# Patient Record
Sex: FEMALE | Race: WHITE | NOT HISPANIC OR LATINO | Employment: PART TIME | URBAN - METROPOLITAN AREA
[De-identification: names, ages, dates, MRNs, and addresses within clinical notes are randomized per-mention and may not be internally consistent; named-entity substitution may affect disease eponyms.]

---

## 2018-06-11 ENCOUNTER — OFFICE VISIT (OUTPATIENT)
Dept: FAMILY MEDICINE CLINIC | Facility: CLINIC | Age: 53
End: 2018-06-11

## 2018-06-11 VITALS
WEIGHT: 143.2 LBS | HEIGHT: 68 IN | RESPIRATION RATE: 18 BRPM | HEART RATE: 57 BPM | OXYGEN SATURATION: 99 % | DIASTOLIC BLOOD PRESSURE: 76 MMHG | TEMPERATURE: 99.5 F | SYSTOLIC BLOOD PRESSURE: 128 MMHG | BODY MASS INDEX: 21.7 KG/M2

## 2018-06-11 DIAGNOSIS — Z00.00 HEALTHCARE MAINTENANCE: ICD-10-CM

## 2018-06-11 DIAGNOSIS — M51.36 DEGENERATION, INTERVERTEBRAL DISC, LUMBAR: ICD-10-CM

## 2018-06-11 DIAGNOSIS — Q61.3 POLYCYSTIC KIDNEY DISEASE: Primary | ICD-10-CM

## 2018-06-11 PROCEDURE — 99203 OFFICE O/P NEW LOW 30 MIN: CPT | Performed by: FAMILY MEDICINE

## 2018-06-11 NOTE — PROGRESS NOTES
Alexsandra Valdes presents today to establish care.    Chief Complaint   Patient presents with   • Establish Care   • Polycystic Kidney Disease     and Cysts on Liver        HPI   Kidney disease:  Sister's kidneys started failing, went through process to donate kidney 5 years ago but discovered cysts on liver and kidneys. Saw nephologist and told to monitor. Yearly blood and urine tests. Kidney function stable. No recurrent UTIs. No HYPERTENSION.     Low back pain:  Degenerative disc left lower back. Sciatica pain left buttock. Treated with PT for 3 month and now chiropractor. Use OTC ibuprofen a couple time a month, otherwise Tylenol arthritis.  FH back problems.     Skin cancer:  H/o basal and squamous cell skin cancers. Followed by dermatology.  She denies any new lesions.      Walks every day. After walk has bilateral knee pain.     Mammogram yearly.     H/o cervical polyp removed in August. Yearly GYN exams.     Review of Systems   Constitutional: Negative.    Eyes: Negative.    Respiratory: Negative.    Cardiovascular: Negative.    Gastrointestinal: Negative.    Endocrine: Negative.    Genitourinary: Negative.    Musculoskeletal: Positive for arthralgias and back pain.   Skin: Negative.    Neurological: Negative.    Hematological: Negative.    Psychiatric/Behavioral: Negative.         Past Medical History:   Diagnosis Date   • Low back pain    • Polycystic kidney    • Polycystic liver disease    • Skin cancer, basal cell    • Squamous cell skin cancer         Past Surgical History:   Procedure Laterality Date   • MOUTH SURGERY     • TUBAL ABDOMINAL LIGATION          Family History   Problem Relation Age of Onset   • Hypertension Mother    • Heart disease Father    • Polycystic kidney disease Sister    • Other Brother    • Polycystic kidney disease Brother    • Cancer Son    • Cancer Maternal Grandfather    • Heart disease Paternal Grandmother    • Hypertension Daughter         Social History     Social History   •  "Marital status:      Spouse name: N/A   • Number of children: N/A   • Years of education: N/A     Occupational History   •       Social History Main Topics   • Smoking status: Never Smoker   • Smokeless tobacco: Never Used   • Alcohol use No   • Drug use: No   • Sexual activity: Not on file     Other Topics Concern   • Not on file     Social History Narrative   • No narrative on file        Current Outpatient Prescriptions   Medication Sig Dispense Refill   • Multiple Vitamins-Minerals (MULTIVITAMIN ADULT PO) Take 1 tablet by mouth Daily.       No current facility-administered medications for this visit.        Allergies no known allergies     Visit Vitals  /76 (BP Location: Right arm, Patient Position: Sitting)   Pulse 57   Temp 99.5 °F (37.5 °C) (Temporal Artery )   Resp 18   Ht 172.5 cm (67.91\")   Wt 65 kg (143 lb 3.2 oz)   SpO2 99%   BMI 21.83 kg/m²        Physical Exam   Constitutional: No distress.   Cardiovascular: Normal rate, regular rhythm and intact distal pulses.    No murmur heard.  Pulmonary/Chest: Effort normal and breath sounds normal.   Abdominal: Normal appearance. There is no hepatosplenomegaly. There is no tenderness.   Musculoskeletal:        Lumbar back: She exhibits no bony tenderness, no edema and no spasm.   Negative SLR bilateral   Skin: Skin is warm and dry. No rash noted.   Psychiatric: She has a normal mood and affect.   Vitals reviewed.       No results found for this or any previous visit.     Alexsandra was seen today for establish care and polycystic kidney disease.    Diagnoses and all orders for this visit:    Polycystic kidney disease  -     Comprehensive Metabolic Panel; Future  -     MicroAlbumin, Urine, Random - Urine, Clean Catch; Future  Obtain prior records.  No hypertension or known complications at this time.  Discussed limiting NSAIDs.  Patient reports yearly labs in November.  Plan to check labs 2-3 days prior to next appointment for a " physical.  Degeneration, intervertebral disc, lumbar  Obtain prior records.  Patient reports x-rays done by chiropractor.  Again advised to limit NSAIDs.  Use Tylenol when necessary.  Healthcare maintenance  -     CBC & Differential; Future  -     Lipid Panel; Future  -     TSH Rfx On Abnormal To Free T4; Future  Patient due for a yearly physical in November and plans to get labs 2-3 days in advance.

## 2018-10-22 ENCOUNTER — CLINICAL SUPPORT (OUTPATIENT)
Dept: FAMILY MEDICINE CLINIC | Facility: CLINIC | Age: 53
End: 2018-10-22

## 2018-10-22 DIAGNOSIS — Z00.00 HEALTH MAINTENANCE EXAMINATION: Primary | ICD-10-CM

## 2018-10-22 PROCEDURE — 86580 TB INTRADERMAL TEST: CPT | Performed by: FAMILY MEDICINE

## 2018-10-24 ENCOUNTER — CLINICAL SUPPORT (OUTPATIENT)
Dept: FAMILY MEDICINE CLINIC | Facility: CLINIC | Age: 53
End: 2018-10-24

## 2018-10-24 DIAGNOSIS — Z23 FLU VACCINE NEED: ICD-10-CM

## 2018-10-24 DIAGNOSIS — Z11.1 VISIT FOR TB SKIN TEST: Primary | ICD-10-CM

## 2018-10-24 LAB
INDURATION: 0 MM (ref 0–10)
TB SKIN TEST: NEGATIVE

## 2018-10-24 PROCEDURE — 90471 IMMUNIZATION ADMIN: CPT | Performed by: FAMILY MEDICINE

## 2018-10-24 PROCEDURE — 90686 IIV4 VACC NO PRSV 0.5 ML IM: CPT | Performed by: FAMILY MEDICINE

## 2018-11-27 ENCOUNTER — LAB (OUTPATIENT)
Dept: LAB | Facility: HOSPITAL | Age: 53
End: 2018-11-27

## 2018-11-27 DIAGNOSIS — Z00.00 HEALTHCARE MAINTENANCE: ICD-10-CM

## 2018-11-27 DIAGNOSIS — Q61.3 POLYCYSTIC KIDNEY DISEASE: ICD-10-CM

## 2018-11-27 LAB
ALBUMIN SERPL-MCNC: 4.61 G/DL (ref 3.2–4.8)
ALBUMIN/GLOB SERPL: 2.2 G/DL (ref 1.5–2.5)
ALP SERPL-CCNC: 66 U/L (ref 25–100)
ALT SERPL W P-5'-P-CCNC: 16 U/L (ref 7–40)
ANION GAP SERPL CALCULATED.3IONS-SCNC: 4 MMOL/L (ref 3–11)
ARTICHOKE IGE QN: 124 MG/DL (ref 0–130)
AST SERPL-CCNC: 23 U/L (ref 0–33)
BASOPHILS # BLD AUTO: 0.06 10*3/MM3 (ref 0–0.2)
BASOPHILS NFR BLD AUTO: 1.2 % (ref 0–1)
BILIRUB SERPL-MCNC: 0.6 MG/DL (ref 0.3–1.2)
BUN BLD-MCNC: 16 MG/DL (ref 9–23)
BUN/CREAT SERPL: 22.2 (ref 7–25)
CALCIUM SPEC-SCNC: 9.5 MG/DL (ref 8.7–10.4)
CHLORIDE SERPL-SCNC: 105 MMOL/L (ref 99–109)
CHOLEST SERPL-MCNC: 209 MG/DL (ref 0–200)
CO2 SERPL-SCNC: 31 MMOL/L (ref 20–31)
CREAT BLD-MCNC: 0.72 MG/DL (ref 0.6–1.3)
DEPRECATED RDW RBC AUTO: 40.8 FL (ref 37–54)
EOSINOPHIL # BLD AUTO: 0.14 10*3/MM3 (ref 0–0.3)
EOSINOPHIL NFR BLD AUTO: 2.9 % (ref 0–3)
ERYTHROCYTE [DISTWIDTH] IN BLOOD BY AUTOMATED COUNT: 12.7 % (ref 11.3–14.5)
GFR SERPL CREATININE-BSD FRML MDRD: 84 ML/MIN/1.73
GLOBULIN UR ELPH-MCNC: 2.1 GM/DL
GLUCOSE BLD-MCNC: 83 MG/DL (ref 70–100)
HCT VFR BLD AUTO: 38.9 % (ref 34.5–44)
HDLC SERPL-MCNC: 84 MG/DL (ref 40–60)
HGB BLD-MCNC: 13 G/DL (ref 11.5–15.5)
IMM GRANULOCYTES # BLD: 0 10*3/MM3 (ref 0–0.03)
IMM GRANULOCYTES NFR BLD: 0 % (ref 0–0.6)
LYMPHOCYTES # BLD AUTO: 1.41 10*3/MM3 (ref 0.6–4.8)
LYMPHOCYTES NFR BLD AUTO: 28.8 % (ref 24–44)
MCH RBC QN AUTO: 29.2 PG (ref 27–31)
MCHC RBC AUTO-ENTMCNC: 33.4 G/DL (ref 32–36)
MCV RBC AUTO: 87.4 FL (ref 80–99)
MONOCYTES # BLD AUTO: 0.41 10*3/MM3 (ref 0–1)
MONOCYTES NFR BLD AUTO: 8.4 % (ref 0–12)
NEUTROPHILS # BLD AUTO: 2.87 10*3/MM3 (ref 1.5–8.3)
NEUTROPHILS NFR BLD AUTO: 58.7 % (ref 41–71)
PLATELET # BLD AUTO: 225 10*3/MM3 (ref 150–450)
PMV BLD AUTO: 10.3 FL (ref 6–12)
POTASSIUM BLD-SCNC: 4.4 MMOL/L (ref 3.5–5.5)
PROT SERPL-MCNC: 6.7 G/DL (ref 5.7–8.2)
RBC # BLD AUTO: 4.45 10*6/MM3 (ref 3.89–5.14)
SODIUM BLD-SCNC: 140 MMOL/L (ref 132–146)
TRIGL SERPL-MCNC: 43 MG/DL (ref 0–150)
TSH SERPL DL<=0.05 MIU/L-ACNC: 3.95 MIU/ML (ref 0.35–5.35)
WBC NRBC COR # BLD: 4.89 10*3/MM3 (ref 3.5–10.8)

## 2018-11-27 PROCEDURE — 80061 LIPID PANEL: CPT | Performed by: FAMILY MEDICINE

## 2018-11-27 PROCEDURE — 36415 COLL VENOUS BLD VENIPUNCTURE: CPT

## 2018-11-27 PROCEDURE — 85025 COMPLETE CBC W/AUTO DIFF WBC: CPT | Performed by: FAMILY MEDICINE

## 2018-11-27 PROCEDURE — 84443 ASSAY THYROID STIM HORMONE: CPT | Performed by: FAMILY MEDICINE

## 2018-11-27 PROCEDURE — 82043 UR ALBUMIN QUANTITATIVE: CPT | Performed by: FAMILY MEDICINE

## 2018-11-27 PROCEDURE — 80053 COMPREHEN METABOLIC PANEL: CPT | Performed by: FAMILY MEDICINE

## 2018-11-28 LAB — MICROALBUMIN UR-MCNC: 9 UG/ML

## 2018-12-03 ENCOUNTER — LAB (OUTPATIENT)
Dept: LAB | Facility: HOSPITAL | Age: 53
End: 2018-12-03

## 2018-12-03 ENCOUNTER — OFFICE VISIT (OUTPATIENT)
Dept: FAMILY MEDICINE CLINIC | Facility: CLINIC | Age: 53
End: 2018-12-03

## 2018-12-03 VITALS
OXYGEN SATURATION: 98 % | SYSTOLIC BLOOD PRESSURE: 124 MMHG | HEIGHT: 68 IN | DIASTOLIC BLOOD PRESSURE: 76 MMHG | BODY MASS INDEX: 21.82 KG/M2 | HEART RATE: 70 BPM | WEIGHT: 144 LBS

## 2018-12-03 DIAGNOSIS — Z11.9 SCREENING EXAMINATION FOR INFECTIOUS DISEASE: ICD-10-CM

## 2018-12-03 DIAGNOSIS — Z00.00 WELL ADULT EXAM: Primary | ICD-10-CM

## 2018-12-03 DIAGNOSIS — Z23 NEED FOR DIPHTHERIA-TETANUS-PERTUSSIS (TDAP) VACCINE: ICD-10-CM

## 2018-12-03 DIAGNOSIS — R94.4 DECREASED CREATININE CLEARANCE: ICD-10-CM

## 2018-12-03 LAB
ANION GAP SERPL CALCULATED.3IONS-SCNC: 4 MMOL/L (ref 3–11)
BUN BLD-MCNC: 15 MG/DL (ref 9–23)
BUN/CREAT SERPL: 21.1 (ref 7–25)
CALCIUM SPEC-SCNC: 9.4 MG/DL (ref 8.7–10.4)
CHLORIDE SERPL-SCNC: 106 MMOL/L (ref 99–109)
CO2 SERPL-SCNC: 32 MMOL/L (ref 20–31)
CREAT BLD-MCNC: 0.71 MG/DL (ref 0.6–1.3)
GFR SERPL CREATININE-BSD FRML MDRD: 85 ML/MIN/1.73
GLUCOSE BLD-MCNC: 83 MG/DL (ref 70–100)
HCV AB SER DONR QL: NORMAL
POTASSIUM BLD-SCNC: 4.1 MMOL/L (ref 3.5–5.5)
SODIUM BLD-SCNC: 142 MMOL/L (ref 132–146)

## 2018-12-03 PROCEDURE — 86803 HEPATITIS C AB TEST: CPT | Performed by: FAMILY MEDICINE

## 2018-12-03 PROCEDURE — 36415 COLL VENOUS BLD VENIPUNCTURE: CPT

## 2018-12-03 PROCEDURE — 90715 TDAP VACCINE 7 YRS/> IM: CPT | Performed by: FAMILY MEDICINE

## 2018-12-03 PROCEDURE — 90471 IMMUNIZATION ADMIN: CPT | Performed by: FAMILY MEDICINE

## 2018-12-03 PROCEDURE — 80048 BASIC METABOLIC PNL TOTAL CA: CPT | Performed by: FAMILY MEDICINE

## 2018-12-03 PROCEDURE — 99396 PREV VISIT EST AGE 40-64: CPT | Performed by: FAMILY MEDICINE

## 2018-12-03 NOTE — PROGRESS NOTES
Chief Complaint   Patient presents with   • Annual Exam        HPI   Physical:  In 2009 [place] trip had hepatitis A and might have had tetanus immunization.      Fingers, knees and lower back left side pain. 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  Followed by chiropractor.     Sister had kidney transplant, but she was unable to donate because she has cyst on her kidneys and liver. Saw nephrologist at [facility] 5 years ago.     Regular diet. Eats more veggies and fruit than meat. Walks 3.5 miles a day with dogs before work.     Review of Systems   Constitutional: Positive for fatigue.   HENT: Negative for hearing loss.    Eyes: Negative for visual disturbance.        Dry eyes   Respiratory: Negative for cough and shortness of breath.    Cardiovascular: Negative for chest pain and palpitations.   Gastrointestinal: Negative for abdominal pain, blood in stool, constipation, diarrhea and GERD.   Genitourinary: Negative for frequency.   Musculoskeletal: Positive for arthralgias and back pain.   Skin: Negative for rash and skin lesions.   Neurological: Positive for headache. Negative for dizziness.   Hematological: Does not bruise/bleed easily.   Psychiatric/Behavioral: Negative for sleep disturbance and depressed mood.        Current Outpatient Medications on File Prior to Visit   Medication Sig Dispense Refill   • Multiple Vitamins-Minerals (MULTIVITAMIN ADULT PO) Take 1 tablet by mouth Daily.       No current facility-administered medications on file prior to visit.        No Known Allergies    Past Medical History:   Diagnosis Date   • Anxiety 07/15/2013   • Hyperlipidemia 2018   • Low back pain    • Palpitations 07/15/2013   • Polycystic kidney    • Polycystic liver disease    • Rotator cuff disorder 11/21/2016   • Shingles    • Skin cancer, basal cell    • Squamous cell skin cancer         Past Surgical History:   Procedure Laterality Date   • MOUTH SURGERY     • TUBAL ABDOMINAL LIGATION       "    Family History   Problem Relation Age of Onset   • Hypertension Mother    • Heart disease Father    • Polycystic kidney disease Sister    • Other Sister         degenerative disc disease   • Other Brother         degenerative disc disease   • Polycystic kidney disease Brother    • Cancer Son    • Other Son         degenerative disc disease   • Cancer Maternal Grandfather    • Heart disease Paternal Grandmother    • Hypertension Daughter         Social History     Socioeconomic History   • Marital status:      Spouse name: Not on file   • Number of children: Not on file   • Years of education: Not on file   • Highest education level: Not on file   Social Needs   • Financial resource strain: Not on file   • Food insecurity - worry: Not on file   • Food insecurity - inability: Not on file   • Transportation needs - medical: Not on file   • Transportation needs - non-medical: Not on file   Occupational History   • Occupation:    Tobacco Use   • Smoking status: Never Smoker   • Smokeless tobacco: Never Used   Substance and Sexual Activity   • Alcohol use: No   • Drug use: No   • Sexual activity: Not on file   Other Topics Concern   • Not on file   Social History Narrative   • Not on file        Visit Vitals  /76 (BP Location: Left arm, Patient Position: Sitting, Cuff Size: Adult)   Pulse 70   Ht 172.7 cm (68\")   Wt 65.3 kg (144 lb)   SpO2 98%   BMI 21.90 kg/m²        Physical Exam   Constitutional: She is oriented to person, place, and time. No distress.   HENT:   Nose: Nose normal.   Mouth/Throat: Oropharynx is clear and moist.   Eyes: Conjunctivae are normal. Pupils are equal, round, and reactive to light.   Neck: Neck supple. No thyromegaly present.   Cardiovascular: Normal rate and regular rhythm.   No murmur heard.  Pulses:       Posterior tibial pulses are 2+ on the right side, and 2+ on the left side.   Pulmonary/Chest: Effort normal and breath sounds normal. Right breast exhibits " no inverted nipple, no mass, no nipple discharge, no skin change and no tenderness. Left breast exhibits no inverted nipple, no mass, no nipple discharge, no skin change and no tenderness.   Raleigh Pennington MA.      Abdominal: Soft. There is no hepatosplenomegaly. There is no tenderness.   Musculoskeletal: She exhibits no edema.   Lymphadenopathy:     She has no cervical adenopathy.   Neurological: She is alert and oriented to person, place, and time.   Skin: Skin is warm and dry. No rash noted.   Psychiatric: She has a normal mood and affect. Her behavior is normal. Judgment and thought content normal.   Vitals reviewed.      Results for orders placed or performed in visit on 11/27/18   Comprehensive Metabolic Panel   Result Value Ref Range    Glucose 83 70 - 100 mg/dL    BUN 16 9 - 23 mg/dL    Creatinine 0.72 0.60 - 1.30 mg/dL    Sodium 140 132 - 146 mmol/L    Potassium 4.4 3.5 - 5.5 mmol/L    Chloride 105 99 - 109 mmol/L    CO2 31.0 20.0 - 31.0 mmol/L    Calcium 9.5 8.7 - 10.4 mg/dL    Total Protein 6.7 5.7 - 8.2 g/dL    Albumin 4.61 3.20 - 4.80 g/dL    ALT (SGPT) 16 7 - 40 U/L    AST (SGOT) 23 0 - 33 U/L    Alkaline Phosphatase 66 25 - 100 U/L    Total Bilirubin 0.6 0.3 - 1.2 mg/dL    eGFR Non African Amer 84 >60 mL/min/1.73    Globulin 2.1 gm/dL    A/G Ratio 2.2 1.5 - 2.5 g/dL    BUN/Creatinine Ratio 22.2 7.0 - 25.0    Anion Gap 4.0 3.0 - 11.0 mmol/L   Lipid Panel   Result Value Ref Range    Total Cholesterol 209 (H) 0 - 200 mg/dL    Triglycerides 43 0 - 150 mg/dL    HDL Cholesterol 84 (H) 40 - 60 mg/dL    LDL Cholesterol  124 0 - 130 mg/dL   TSH Rfx On Abnormal To Free T4   Result Value Ref Range    TSH 3.949 0.350 - 5.350 mIU/mL   MicroAlbumin, Urine, Random - Urine, Clean Catch   Result Value Ref Range    Microalbumin, Urine 9.0 Not Estab. ug/mL   CBC Auto Differential   Result Value Ref Range    WBC 4.89 3.50 - 10.80 10*3/mm3    RBC 4.45 3.89 - 5.14 10*6/mm3    Hemoglobin 13.0 11.5 - 15.5 g/dL     Hematocrit 38.9 34.5 - 44.0 %    MCV 87.4 80.0 - 99.0 fL    MCH 29.2 27.0 - 31.0 pg    MCHC 33.4 32.0 - 36.0 g/dL    RDW 12.7 11.3 - 14.5 %    RDW-SD 40.8 37.0 - 54.0 fl    MPV 10.3 6.0 - 12.0 fL    Platelets 225 150 - 450 10*3/mm3    Neutrophil % 58.7 41.0 - 71.0 %    Lymphocyte % 28.8 24.0 - 44.0 %    Monocyte % 8.4 0.0 - 12.0 %    Eosinophil % 2.9 0.0 - 3.0 %    Basophil % 1.2 (H) 0.0 - 1.0 %    Immature Grans % 0.0 0.0 - 0.6 %    Neutrophils, Absolute 2.87 1.50 - 8.30 10*3/mm3    Lymphocytes, Absolute 1.41 0.60 - 4.80 10*3/mm3    Monocytes, Absolute 0.41 0.00 - 1.00 10*3/mm3    Eosinophils, Absolute 0.14 0.00 - 0.30 10*3/mm3    Basophils, Absolute 0.06 0.00 - 0.20 10*3/mm3    Immature Grans, Absolute 0.00 0.00 - 0.03 10*3/mm3   The 10-year ASCVD risk score (Guy GIANA Jr., et al., 2013) is: 1.3%    Values used to calculate the score:      Age: 53 years      Sex: Female      Is Non- : No      Diabetic: No      Tobacco smoker: No      Systolic Blood Pressure: 124 mmHg      Is BP treated: No      HDL Cholesterol: 84 mg/dL      Total Cholesterol: 209 mg/dL       Immunization History   Administered Date(s) Administered   • FLUARIX/FLUZONE/AFLURIA/FLULAVAL QUAD 10/24/2018   • Flu Vaccine Intradermal Quad 18-64YR 12/21/2012   • PPD Test 12/21/2012, 10/22/2018   • Tdap 12/03/2018       Alexsandra was seen today for annual exam.    Diagnoses and all orders for this visit:    Well adult exam  Prior OB/GYN records and pap smear requested.   Mammogram birads 2 7/17/2018.   Colonoscopy current.   Counseled on Shingrix vaccine and handout provided.   Reviewed pre visit labs in detail with patient.   Need for diphtheria-tetanus-pertussis (Tdap) vaccine  -     Tdap Vaccine Greater Than or Equal To 8yo IM  Last tetanus unknown, updated today.   Decreased creatinine clearance  -     Basic metabolic panel; Future   -> 84. Check repeat and if still low will refer back to nephrology due to FH polycystic kidney  disease.   Screening examination for infectious disease  -     Hepatitis C Antibody; Future  [id number] screening.       Return in about 1 year (around 12/3/2019) for Annual physical.

## 2018-12-03 NOTE — PATIENT INSTRUCTIONS
Recombinant Zoster (Shingles) Vaccine, RZV: What You Need to Know  1. Why get vaccinated?  Shingles (also called herpes zoster, or just zoster) is a painful skin rash, often with blisters. Shingles is caused by the varicella zoster virus, the same virus that causes chickenpox. After you have chickenpox, the virus stays in your body and can cause shingles later in life.  You can't catch shingles from another person. However, a person who has never had chickenpox (or chickenpox vaccine) could get chickenpox from someone with shingles.  A shingles rash usually appears on one side of the face or body and heals within 2 to 4 weeks. Its main symptom is pain, which can be severe. Other symptoms can include fever, headache, chills and upset stomach. Very rarely, a shingles infection can lead to pneumonia, hearing problems, blindness, brain inflammation (encephalitis), or death.  For about 1 person in 5, severe pain can continue even long after the rash has cleared up. This long-lasting pain is called post-herpetic neuralgia (PHN).  Shingles is far more common in people 50 years of age and older than in younger people, and the risk increases with age. It is also more common in people whose immune system is weakened because of a disease such as cancer, or by drugs such as steroids or chemotherapy.  At least 1 million people a year in the [place] get shingles.  2. Shingles vaccine (recombinant)  Recombinant shingles vaccine was approved by FDA in 2017 for the prevention of shingles. In clinical trials, it was more than 90% effective in preventing shingles. It can also reduce the likelihood of PHN.  Two doses, 2 to 6 months apart, are recommended for adults 50 and older.  This vaccine is also recommended for people who have already gotten the live shingles vaccine (Zostavax). There is no live virus in this vaccine.  3. Some people should not get this vaccine  Tell your vaccine provider if you:  · Have any severe,  life-threatening allergies. A person who has ever had a life-threatening allergic reaction after a dose of recombinant shingles vaccine, or has a severe allergy to any component of this vaccine, may be advised not to be vaccinated. Ask your health care provider if you want information about vaccine components.  · Are pregnant or breastfeeding. There is not much information about use of recombinant shingles vaccine in pregnant or nursing women. Your healthcare provider might recommend delaying vaccination.  · Are not feeling well. If you have a mild illness, such as a cold, you can probably get the vaccine today. If you are moderately or severely ill, you should probably wait until you recover. Your doctor can advise you.    4. Risks of a vaccine reaction  With any medicine, including vaccines, there is a chance of reactions.  After recombinant shingles vaccination, a person might experience:  · Pain, redness, soreness, or swelling at the site of the injection  · Headache, muscle aches, fever, shivering, fatigue    In clinical trials, most people got a sore arm with mild or moderate pain after vaccination, and some also had redness and swelling where they got the shot. Some people felt tired, had muscle pain, a headache, shivering, fever, stomach pain, or nausea. About 1 out of 6 people who got recombinant zoster vaccine experienced side effects that prevented them from doing regular activities. Symptoms went away on their own in about 2 to 3 days. Side effects were more common in younger people.  You should still get the second dose of recombinant zoster vaccine even if you had one of these reactions after the first dose.  Other things that could happen after this vaccine:  · People sometimes faint after medical procedures, including vaccination. Sitting or lying down for about 15 minutes can help prevent fainting and injuries caused by a fall. Tell your provider if you feel dizzy or have vision changes or ringing  in the ears.  · Some people get shoulder pain that can be more severe and longer-lasting than routine soreness that can follow injections. This happens very rarely.  · Any medication can cause a severe allergic reaction. Such reactions to a vaccine are estimated at about 1 in a million doses, and would happen within a few minutes to a few hours after the vaccination.  As with any medicine, there is a very remote chance of a vaccine causing a serious injury or death.  The safety of vaccines is always being monitored. For more information, visit: www.cdc.gov/vaccinesafety/  5. What if there is a serious problem?  What should I look for?  · Look for anything that concerns you, such as signs of a severe allergic reaction, very high fever, or unusual behavior.  Signs of a severe allergic reaction can include hives, swelling of the face and throat, difficulty breathing, a fast heartbeat, dizziness, and weakness. These would usually start a few minutes to a few hours after the vaccination.  What should I do?  · If you think it is a severe allergic reaction or other emergency that can't wait, call 9-1-1 and get to the nearest hospital. Otherwise, call your health care provider.  Afterward, the reaction should be reported to the Vaccine Adverse Event Reporting System (VAERS). Your doctor should file this report, or you can do it yourself through the VAERS web site atwww.vaers.[facility].govor by calling 1-845.813.9635.  VAERS does not give medical advice.  6. How can I learn more?  · Ask your healthcare provider. He or she can give you the vaccine package insert or suggest other sources of information.  · Call your local or state health department.  · Contact the Centers for Disease Control and Prevention (CDC):  ? Call 1-267.137.3630 (6-050-DPI-INFO) or  ? Visit the CDC's website at www.cdc.gov/vaccines  CDC Vaccine Information Statement (VIS) Recombinant Zoster Vaccine (02/12/2018)  This information is not intended to replace  advice given to you by your health care provider. Make sure you discuss any questions you have with your health care provider.  Document Released: 02/27/2018 Document Revised: 02/27/2018 Document Reviewed: 02/27/2018  Elsevier Interactive Patient Education © 2018 [employer]

## 2018-12-04 DIAGNOSIS — R94.4 DECREASED GFR: ICD-10-CM

## 2018-12-04 DIAGNOSIS — Z82.71 FAMILY HISTORY OF ADULT POLYCYSTIC KIDNEY DISEASE: Primary | ICD-10-CM

## 2019-07-22 ENCOUNTER — TELEPHONE (OUTPATIENT)
Dept: FAMILY MEDICINE CLINIC | Facility: CLINIC | Age: 54
End: 2019-07-22

## 2019-07-22 RX ORDER — ONDANSETRON 4 MG/1
4 TABLET, FILM COATED ORAL EVERY 8 HOURS PRN
Qty: 10 TABLET | Refills: 0 | Status: SHIPPED | OUTPATIENT
Start: 2019-07-22 | End: 2019-12-04

## 2019-07-22 RX ORDER — SCOLOPAMINE TRANSDERMAL SYSTEM 1 MG/1
1 PATCH, EXTENDED RELEASE TRANSDERMAL
Qty: 3 PATCH | Refills: 0 | Status: SHIPPED | OUTPATIENT
Start: 2019-07-22 | End: 2019-12-04

## 2019-07-22 NOTE — TELEPHONE ENCOUNTER
at appt today mentioned going a cruise and needs medication for motion sickness. Sent script for transderm scop and zofran.

## 2019-12-04 ENCOUNTER — LAB REQUISITION (OUTPATIENT)
Dept: LAB | Facility: HOSPITAL | Age: 54
End: 2019-12-04

## 2019-12-04 ENCOUNTER — OFFICE VISIT (OUTPATIENT)
Dept: FAMILY MEDICINE CLINIC | Facility: CLINIC | Age: 54
End: 2019-12-04

## 2019-12-04 VITALS
OXYGEN SATURATION: 99 % | DIASTOLIC BLOOD PRESSURE: 64 MMHG | HEIGHT: 68 IN | BODY MASS INDEX: 21.64 KG/M2 | SYSTOLIC BLOOD PRESSURE: 102 MMHG | WEIGHT: 142.8 LBS | HEART RATE: 66 BPM

## 2019-12-04 DIAGNOSIS — Q61.3 POLYCYSTIC KIDNEY DISEASE: ICD-10-CM

## 2019-12-04 DIAGNOSIS — E78.00 PURE HYPERCHOLESTEROLEMIA: ICD-10-CM

## 2019-12-04 DIAGNOSIS — Z00.00 WELL ADULT EXAM: Primary | ICD-10-CM

## 2019-12-04 DIAGNOSIS — Z23 NEED FOR VACCINATION: ICD-10-CM

## 2019-12-04 DIAGNOSIS — Z00.00 ROUTINE GENERAL MEDICAL EXAMINATION AT A HEALTH CARE FACILITY: ICD-10-CM

## 2019-12-04 PROCEDURE — 99396 PREV VISIT EST AGE 40-64: CPT | Performed by: FAMILY MEDICINE

## 2019-12-04 PROCEDURE — 36415 COLL VENOUS BLD VENIPUNCTURE: CPT | Performed by: INTERNAL MEDICINE

## 2019-12-04 PROCEDURE — 90471 IMMUNIZATION ADMIN: CPT | Performed by: FAMILY MEDICINE

## 2019-12-04 PROCEDURE — 90632 HEPA VACCINE ADULT IM: CPT | Performed by: FAMILY MEDICINE

## 2019-12-04 PROCEDURE — 90472 IMMUNIZATION ADMIN EACH ADD: CPT | Performed by: FAMILY MEDICINE

## 2019-12-04 PROCEDURE — 90686 IIV4 VACC NO PRSV 0.5 ML IM: CPT | Performed by: FAMILY MEDICINE

## 2019-12-04 PROCEDURE — 80061 LIPID PANEL: CPT | Performed by: FAMILY MEDICINE

## 2019-12-04 PROCEDURE — 80053 COMPREHEN METABOLIC PANEL: CPT | Performed by: FAMILY MEDICINE

## 2019-12-04 NOTE — PROGRESS NOTES
Alexsandra Valdes presents today for a physical    Chief Complaint   Patient presents with   • Annual Exam     pt reports that she has already had PAP done in October at Dr Kuhn office        HPI     Generally healthy diet. She eats too much sugar. She walks regularly. She has gained a few pounds.     Menopause, no menses for 2 years.     PHQ-2/PHQ-9 Depression Screening 12/4/2019   Little interest or pleasure in doing things 0   Feeling down, depressed, or hopeless 0   Total Score 0       Saw nephrology at  for polycystic kidneys, had labs.     Back pain is better, sees chiropractor monthly. She takes occasional Tylenol, tries to avoid ibuprofen.         Review of Systems   Constitutional: Positive for fatigue.   HENT: Negative for hearing loss.    Eyes: Negative for visual disturbance.   Respiratory: Negative for cough and shortness of breath.    Cardiovascular: Positive for palpitations. Negative for chest pain.   Gastrointestinal: Negative for constipation, diarrhea and GERD.   Genitourinary: Positive for amenorrhea.   Musculoskeletal: Positive for back pain.   Neurological: Negative for dizziness and headache.   Psychiatric/Behavioral: Negative for depressed mood.        Health Maintenance   Topic Date Due   • ZOSTER VACCINE (1 of 2) 09/13/2013   • INFLUENZA VACCINE  08/01/2019   • LIPID PANEL  11/27/2019   • MAMMOGRAM  07/17/2020   • COLONOSCOPY  01/16/2022   • PAP SMEAR  08/22/2022   • TDAP/TD VACCINES (2 - Td) 12/03/2028       Past Medical History:   Diagnosis Date   • Anxiety 07/15/2013   • Hyperlipidemia 2018   • Low back pain    • Palpitations 07/15/2013   • Polycystic kidney    • Polycystic liver disease    • Rotator cuff disorder 11/21/2016   • Shingles    • Skin cancer, basal cell    • Squamous cell skin cancer         Past Surgical History:   Procedure Laterality Date   • MOUTH SURGERY     • TUBAL ABDOMINAL LIGATION          Family History   Problem Relation Age of Onset   • Hypertension Mother    •  "Heart disease Father    • Polycystic kidney disease Sister    • Other Sister         degenerative disc disease   • Other Brother         degenerative disc disease   • Polycystic kidney disease Brother    • Cancer Son    • Other Son         degenerative disc disease   • Cancer Maternal Grandfather    • Heart disease Paternal Grandmother    • Hypertension Daughter         Social History     Socioeconomic History   • Marital status:      Spouse name: Not on file   • Number of children: Not on file   • Years of education: Not on file   • Highest education level: Not on file   Occupational History   • Occupation:    Tobacco Use   • Smoking status: Never Smoker   • Smokeless tobacco: Never Used   Substance and Sexual Activity   • Alcohol use: No   • Drug use: No        Current Outpatient Medications on File Prior to Visit   Medication Sig Dispense Refill   • Multiple Vitamins-Minerals (MULTIVITAMIN ADULT PO) Take 1 tablet by mouth Daily.     • [DISCONTINUED] ondansetron (ZOFRAN) 4 MG tablet Take 1 tablet by mouth Every 8 (Eight) Hours As Needed for Nausea or Vomiting. 10 tablet 0   • [DISCONTINUED] Scopolamine (TRANSDERM-SCOP, 1.5 MG,) 1.5 MG/3DAYS patch Place 1 patch on the skin as directed by provider Every 72 (Seventy-Two) Hours. 3 patch 0     No current facility-administered medications on file prior to visit.        No Known Allergies     Visit Vitals  /64 (BP Location: Left arm, Patient Position: Sitting, Cuff Size: Adult)   Pulse 66   Ht 172.7 cm (68\")   Wt 64.8 kg (142 lb 12.8 oz)   SpO2 99%   BMI 21.71 kg/m²        Physical Exam   Constitutional: She is oriented to person, place, and time. No distress.   HENT:   Nose: Nose normal.   Mouth/Throat: Oropharynx is clear and moist.   Eyes: Conjunctivae are normal. Pupils are equal, round, and reactive to light.   Neck: Neck supple. No thyromegaly present.   Cardiovascular: Normal rate and regular rhythm.   No murmur heard.  Pulses:       " Posterior tibial pulses are 2+ on the right side, and 2+ on the left side.   Pulmonary/Chest: Effort normal and breath sounds normal.   Abdominal: Soft. There is no hepatosplenomegaly. There is no tenderness.   Lymphadenopathy:     She has no cervical adenopathy.   Neurological: She is alert and oriented to person, place, and time.   Skin: Skin is warm and dry. No rash noted.   Psychiatric: She has a normal mood and affect.   Vitals reviewed.            Immunization History   Administered Date(s) Administered   • FLUARIX/FLUZONE/AFLURIA/FLULAVAL QUAD 10/24/2018, 12/04/2019   • Flu Vaccine Intradermal Quad 18-64YR 12/21/2012   • Hepatitis A 12/04/2019   • PPD Test 12/21/2012, 10/22/2018   • Tdap 12/03/2018       Alexsandra was seen today for annual exam.    Diagnoses and all orders for this visit:    Well adult exam  -     Comprehensive Metabolic Panel; Future  -     Lipid Panel; Future  -     Comprehensive Metabolic Panel  -     Lipid Panel  GYN records and Pap smear requested.  Work form signed.  She stated that she did not need PPD testing today.  Pure hypercholesterolemia  -     Comprehensive Metabolic Panel; Future  -     Lipid Panel; Future  -     Comprehensive Metabolic Panel  -     Lipid Panel  Mild elevation in cholesterol last year.  Not currently on medications.  Recheck today.  Polycystic kidney disease  -     Comprehensive Metabolic Panel; Future  -     Comprehensive Metabolic Panel  Obtain nephrology records.  Check renal function today.  Need for vaccination  -     Fluarix/Fluzone/Afluria Quad>6 Months  -     Hepatitis A Vaccine Adult IM  Return after 6 months for second hepatitis A booster.      Patient's Body mass index is 21.71 kg/m². BMI is within normal parameters. No follow-up required..      Counseled on health maintenance topics and preventative care recommendations: Influenza vaccine, hepatitis A vaccine, cervical cancer screening     Return in about 1 year (around 12/4/2020) for Physical.      Tosha Fine

## 2019-12-05 LAB
ALBUMIN SERPL-MCNC: 4.8 G/DL (ref 3.5–5.2)
ALBUMIN/GLOB SERPL: 1.8 G/DL
ALP SERPL-CCNC: 68 U/L (ref 39–117)
ALT SERPL W P-5'-P-CCNC: 14 U/L (ref 1–33)
ANION GAP SERPL CALCULATED.3IONS-SCNC: 12.9 MMOL/L (ref 5–15)
AST SERPL-CCNC: 22 U/L (ref 1–32)
BILIRUB SERPL-MCNC: 0.5 MG/DL (ref 0.2–1.2)
BUN BLD-MCNC: 14 MG/DL (ref 6–20)
BUN/CREAT SERPL: 20.3 (ref 7–25)
CALCIUM SPEC-SCNC: 9.6 MG/DL (ref 8.6–10.5)
CHLORIDE SERPL-SCNC: 102 MMOL/L (ref 98–107)
CHOLEST SERPL-MCNC: 226 MG/DL (ref 0–200)
CO2 SERPL-SCNC: 26.1 MMOL/L (ref 22–29)
CREAT BLD-MCNC: 0.69 MG/DL (ref 0.57–1)
GFR SERPL CREATININE-BSD FRML MDRD: 88 ML/MIN/1.73
GLOBULIN UR ELPH-MCNC: 2.7 GM/DL
GLUCOSE BLD-MCNC: 82 MG/DL (ref 65–99)
HDLC SERPL-MCNC: 91 MG/DL (ref 40–60)
LDLC SERPL CALC-MCNC: 126 MG/DL (ref 0–100)
LDLC/HDLC SERPL: 1.38 {RATIO}
POTASSIUM BLD-SCNC: 3.8 MMOL/L (ref 3.5–5.2)
PROT SERPL-MCNC: 7.5 G/DL (ref 6–8.5)
SODIUM BLD-SCNC: 141 MMOL/L (ref 136–145)
TRIGL SERPL-MCNC: 47 MG/DL (ref 0–150)
VLDLC SERPL-MCNC: 9.4 MG/DL

## 2020-02-26 ENCOUNTER — OFFICE VISIT (OUTPATIENT)
Dept: FAMILY MEDICINE CLINIC | Facility: CLINIC | Age: 55
End: 2020-02-26

## 2020-02-26 ENCOUNTER — LAB REQUISITION (OUTPATIENT)
Dept: LAB | Facility: HOSPITAL | Age: 55
End: 2020-02-26

## 2020-02-26 VITALS
OXYGEN SATURATION: 98 % | HEART RATE: 88 BPM | DIASTOLIC BLOOD PRESSURE: 88 MMHG | WEIGHT: 145.4 LBS | HEIGHT: 68 IN | SYSTOLIC BLOOD PRESSURE: 158 MMHG | BODY MASS INDEX: 22.04 KG/M2

## 2020-02-26 DIAGNOSIS — Z00.00 ROUTINE GENERAL MEDICAL EXAMINATION AT A HEALTH CARE FACILITY: ICD-10-CM

## 2020-02-26 DIAGNOSIS — R07.89 CHEST PRESSURE: ICD-10-CM

## 2020-02-26 DIAGNOSIS — R03.0 ELEVATED BLOOD PRESSURE READING: Primary | ICD-10-CM

## 2020-02-26 DIAGNOSIS — R00.2 PALPITATIONS: ICD-10-CM

## 2020-02-26 PROCEDURE — 93000 ELECTROCARDIOGRAM COMPLETE: CPT | Performed by: FAMILY MEDICINE

## 2020-02-26 PROCEDURE — 36415 COLL VENOUS BLD VENIPUNCTURE: CPT | Performed by: FAMILY MEDICINE

## 2020-02-26 PROCEDURE — 99214 OFFICE O/P EST MOD 30 MIN: CPT | Performed by: FAMILY MEDICINE

## 2020-02-26 RX ORDER — CARVEDILOL 3.12 MG/1
3.12 TABLET ORAL 2 TIMES DAILY WITH MEALS
Qty: 60 TABLET | Refills: 1 | Status: SHIPPED | OUTPATIENT
Start: 2020-02-26 | End: 2020-05-11

## 2020-02-26 NOTE — PROGRESS NOTES
Chief Complaint   Patient presents with   • Shortness of Breath     pressure in chest, doesn't feel sick, has been going on for a couple of months. Exercises regularly. Occasional headaches. Family history of high blood pressure        Shortness of Breath   This is a new problem. The current episode started today. The problem occurs intermittently. The problem has been rapidly improving. Associated symptoms include chest pain. The symptoms are aggravated by exercise. She has tried rest for the symptoms. The treatment provided significant relief. There is no history of CAD, chronic lung disease or a heart failure.      In December thought she had stress of holidays. Denies feeling anxious.  She is concerned about weight gain, walks every day. Started [employer] 2 times a week last month, but didn't feel right in her chest. Two weeks ago woke up at 3 am heart pounding. Took walk this morning and she got winded. She feels fluttering like she skips a beat. Mother and sister have high blood pressure.       Review of Systems   Respiratory: Positive for shortness of breath.    Cardiovascular: Positive for chest pain and palpitations.   Psychiatric/Behavioral: The patient is not nervous/anxious.         Current Outpatient Medications on File Prior to Visit   Medication Sig Dispense Refill   • Multiple Vitamins-Minerals (MULTIVITAMIN ADULT PO) Take 1 tablet by mouth Daily.       No current facility-administered medications on file prior to visit.        No Known Allergies    Past Medical History:   Diagnosis Date   • Anxiety 07/15/2013   • Hyperlipidemia 2018   • Low back pain    • Palpitations 07/15/2013   • Polycystic kidney    • Polycystic liver disease    • Rotator cuff disorder 11/21/2016   • Shingles    • Skin cancer, basal cell    • Squamous cell skin cancer         Past Surgical History:   Procedure Laterality Date   • MOUTH SURGERY     • TUBAL ABDOMINAL LIGATION          Family History   Problem Relation Age of  "Onset   • Hypertension Mother    • Heart disease Father    • Polycystic kidney disease Sister    • Other Sister         degenerative disc disease   • Other Brother         degenerative disc disease   • Polycystic kidney disease Brother    • Cancer Son    • Other Son         degenerative disc disease   • Cancer Maternal Grandfather    • Heart disease Paternal Grandmother    • Hypertension Daughter         Social History     Socioeconomic History   • Marital status:      Spouse name: Not on file   • Number of children: Not on file   • Years of education: Not on file   • Highest education level: Not on file   Occupational History   • Occupation:    Tobacco Use   • Smoking status: Never Smoker   • Smokeless tobacco: Never Used   Substance and Sexual Activity   • Alcohol use: No   • Drug use: No        Visit Vitals  /88 (BP Location: Left arm, Patient Position: Sitting, Cuff Size: Adult)   Pulse 88   Ht 172.7 cm (68\")   Wt 66 kg (145 lb 6.4 oz)   SpO2 98%   BMI 22.11 kg/m²        Physical Exam   Constitutional: No distress.   Cardiovascular: Normal rate and regular rhythm.   No murmur heard.  Pulmonary/Chest: Effort normal and breath sounds normal.   Musculoskeletal: She exhibits no edema.   Neurological: She is alert.   Skin: Skin is warm and dry.   Psychiatric: She has a normal mood and affect.   Vitals reviewed.         ECG 12 Lead  Date/Time: 2/26/2020 4:20 PM  Performed by: Tosha Bruno MD  Authorized by: Tosha Burno MD   Comparison: compared with previous ECG from 11/22/2017  Rhythm: sinus rhythm  Rate: normal  Conduction: conduction normal  Conduction: incomplete right bundle branch block  ST Segments: ST segments normal  T inversion: V1 and V2  QRS axis: normal    Clinical impression: abnormal EKG and non-specific ECG            Results for orders placed or performed in visit on 12/04/19   Comprehensive Metabolic Panel   Result Value Ref Range    " Glucose 82 65 - 99 mg/dL    BUN 14 6 - 20 mg/dL    Creatinine 0.69 0.57 - 1.00 mg/dL    Sodium 141 136 - 145 mmol/L    Potassium 3.8 3.5 - 5.2 mmol/L    Chloride 102 98 - 107 mmol/L    CO2 26.1 22.0 - 29.0 mmol/L    Calcium 9.6 8.6 - 10.5 mg/dL    Total Protein 7.5 6.0 - 8.5 g/dL    Albumin 4.80 3.50 - 5.20 g/dL    ALT (SGPT) 14 1 - 33 U/L    AST (SGOT) 22 1 - 32 U/L    Alkaline Phosphatase 68 39 - 117 U/L    Total Bilirubin 0.5 0.2 - 1.2 mg/dL    eGFR Non African Amer 88 >60 mL/min/1.73    Globulin 2.7 gm/dL    A/G Ratio 1.8 g/dL    BUN/Creatinine Ratio 20.3 7.0 - 25.0    Anion Gap 12.9 5.0 - 15.0 mmol/L   Lipid Panel   Result Value Ref Range    Total Cholesterol 226 (H) 0 - 200 mg/dL    Triglycerides 47 0 - 150 mg/dL    HDL Cholesterol 91 (H) 40 - 60 mg/dL    LDL Cholesterol  126 (H) 0 - 100 mg/dL    VLDL Cholesterol 9.4 mg/dL    LDL/HDL Ratio 1.38         Alexsandra was seen today for shortness of breath.    Diagnoses and all orders for this visit:    Elevated blood pressure reading  -     ECG 12 Lead  -     Ambulatory Referral to [facility] [place] [facility] [place]  -     carvedilol (COREG) 3.125 MG tablet; Take 1 tablet by mouth 2 (Two) Times a Day With Meals.  -     TSH Rfx On Abnormal To Free T4; Future  -     Basic metabolic panel; Future    Chest pressure  -     ECG 12 Lead  -     Ambulatory Referral to [facility] and [facility] [place] [facility] [place]    Palpitations  -     Ambulatory Referral to [facility] and [facility] [place] - [place]  -     carvedilol (COREG) 3.125 MG tablet; Take 1 tablet by mouth 2 (Two) Times a Day With Meals.  -     TSH Rfx On Abnormal To Free T4; Future  -     Basic metabolic panel; Future      EKG in the office today without signs of ischemia. Recommend further evaluation at heart clinic. Blood pressure significantly elevated compared to her physical in December. Also with new onset palpitations. Check labs today. Start low dose beta blocker. Cautioned on side effects. Monitor BP and pulse at  home.     Return in about 2 weeks (around 3/11/2020).    Dr. Tosha Fine

## 2020-02-27 LAB
BUN SERPL-MCNC: 13 MG/DL (ref 6–20)
BUN/CREAT SERPL: 19.1 (ref 7–25)
CALCIUM SERPL-MCNC: 9.6 MG/DL (ref 8.6–10.5)
CHLORIDE SERPL-SCNC: 99 MMOL/L (ref 98–107)
CO2 SERPL-SCNC: 24.7 MMOL/L (ref 22–29)
CREAT SERPL-MCNC: 0.68 MG/DL (ref 0.57–1)
GLUCOSE SERPL-MCNC: 90 MG/DL (ref 65–99)
POTASSIUM SERPL-SCNC: 4.1 MMOL/L (ref 3.5–5.2)
SODIUM SERPL-SCNC: 138 MMOL/L (ref 136–145)
TSH SERPL DL<=0.005 MIU/L-ACNC: 3.48 UIU/ML (ref 0.27–4.2)

## 2020-03-04 ENCOUNTER — HOSPITAL ENCOUNTER (OUTPATIENT)
Dept: CARDIOLOGY | Facility: HOSPITAL | Age: 55
Discharge: HOME | End: 2020-03-04
Admitting: NURSE PRACTITIONER

## 2020-03-04 ENCOUNTER — OFFICE VISIT (OUTPATIENT)
Dept: CARDIOLOGY | Facility: HOSPITAL | Age: 55
End: 2020-03-04

## 2020-03-04 VITALS
BODY MASS INDEX: 21.88 KG/M2 | HEART RATE: 84 BPM | HEIGHT: 68 IN | RESPIRATION RATE: 21 BRPM | OXYGEN SATURATION: 100 % | DIASTOLIC BLOOD PRESSURE: 70 MMHG | WEIGHT: 144.38 LBS | TEMPERATURE: 98 F | SYSTOLIC BLOOD PRESSURE: 144 MMHG

## 2020-03-04 DIAGNOSIS — R00.2 PALPITATIONS: Primary | ICD-10-CM

## 2020-03-04 DIAGNOSIS — E78.2 MIXED HYPERLIPIDEMIA: ICD-10-CM

## 2020-03-04 DIAGNOSIS — R06.02 SHORTNESS OF BREATH: ICD-10-CM

## 2020-03-04 DIAGNOSIS — R00.2 PALPITATIONS: ICD-10-CM

## 2020-03-04 DIAGNOSIS — R07.89 CHEST DISCOMFORT: ICD-10-CM

## 2020-03-04 DIAGNOSIS — I10 ESSENTIAL HYPERTENSION: ICD-10-CM

## 2020-03-04 PROCEDURE — 0296T HC EXT ECG > 48HR TO 21 DAY RCRD W/CONECT INTL RCRD: CPT

## 2020-03-04 PROCEDURE — 99214 OFFICE O/P EST MOD 30 MIN: CPT | Performed by: NURSE PRACTITIONER

## 2020-03-04 NOTE — PROGRESS NOTES
[facility] Heart Monitor Documentation    Alexsandra Valdes  1965  [id number]  03/04/20    KAMRYN Carrasco    [] ZIO XT Patch  Model [id number] Prescribed for N/A Days    · Serial Number: (N + 9 Digits) N   · Apply-By Date on Box:   · USPS Tracking Number:   · USPS Tracking        [] [employer] BodyGuardian MINI PLUS Mobile Cardiac Telemetry  Model BGMINIPLUS Prescribed for N/A Days    · Serial Number: (BGM + 7 Digits) BGM  · Shipped-By Date on Box: [id number] Tracking      [] [employer] BodyGuardian MINI Holter Monitor  Model BGMINIEL Prescribed for 14 Days    · Serial Number: (7 Digits) [id number]  · Shipped-By Date on Box: [id number]  · UPS Tracking Number: [id number]  · UPS Tracking        This monitor was applied to the patient's chest and checked for proper functioning.  Ms. Alexsandra Valdes was instructed in the proper use of this monitor.  She was given the opportunity to ask questions and left the office with the device 's instruction manual.    Maryse Alonzo MA, 9:31 AM, 03/04/20                  [facility]

## 2020-03-04 NOTE — PATIENT INSTRUCTIONS
Only hold the night time dose of coreg before the stress test and do not take morning dose of coreg the day of stress test.

## 2020-03-04 NOTE — PROGRESS NOTES
[facility]  Heart and Valve Center      Encounter Date:03/04/2020     [facility]  [address] [facility] [address]  [unfilled]    1965    Tosha Bruno MD    Alexsandra Valdes is a 54 y.o. female.      Subjective:     Chief Complaint:  Chest Pain and Hypertension       HPI     Answers for HPI/ROS submitted by the patient on 2/27/2020   Hypertension  What is the primary reason for your visit?: High Blood Pressure    54-year-old female with a history of polycystic kidneys, anxiety, palpitations, hyperlipidemia.  Patient presented to her primary care provider complaining of shortness of breath, intermittent greater than 2 weeks.  Associated chest discomfort.  Worsened by exercise.  Improved with rest.  No history of CAD, chronic lung disease.  She has had recent palpitations that woke her from sleep described as a fluttering sensation.  She is found to have an elevated blood pressure and recently started on carvedilol.  Pt reports she has not started yet. Pt walks 3 miles daily.   Referred to the heart valve center for evaluation.    Father MI 40's.  Sister and mother both have high blood pressure.  NO hx of smoking/ETOH.      Patient Active Problem List    Diagnosis Date Noted   • Pure hypercholesterolemia 12/04/2019   • Polycystic kidney disease 06/11/2018   • Degeneration, intervertebral disc, lumbar 06/11/2018         Past Surgical History:   Procedure Laterality Date   • MOUTH SURGERY     • TUBAL ABDOMINAL LIGATION         No Known Allergies      Current Outpatient Medications:   •  Multiple Vitamins-Minerals (MULTIVITAMIN ADULT PO), Take 1 tablet by mouth Daily., Disp: , Rfl:   •  carvedilol (COREG) 3.125 MG tablet, Take 1 tablet by mouth 2 (Two) Times a Day With Meals., Disp: 60 tablet, Rfl: 1    The following portions of the patient's history were reviewed and updated today during office visit as appropriate: allergies, current medications, past family history, past medical  "history, past social history, past surgical history and problem list.    Review of Systems   Constitution: Positive for malaise/fatigue.   Eyes: Negative for blurred vision.   Cardiovascular: Positive for chest pain and palpitations. Negative for orthopnea and paroxysmal nocturnal dyspnea.   Respiratory: Positive for shortness of breath.    Musculoskeletal: Negative for neck pain.   Neurological: Positive for headaches.   All other systems reviewed and are negative.      Objective:     Vitals:    03/04/20 0845 03/04/20 0848 03/04/20 0849   BP: 150/77 134/73 144/70   BP Location: Right arm Left arm Left arm   Patient Position: Sitting Standing Sitting   Cuff Size: Adult Adult Adult   Pulse: 85 83 84   Resp:   21   Temp:   98 °F (36.7 °C)   TempSrc:   Temporal   SpO2: 100% 100% 100%   Weight:   65.5 kg (144 lb 6 oz)   Height:   172.7 cm (68\")         Physical Exam   Constitutional: She is oriented to person, place, and time. She appears well-developed and well-nourished. No distress.   HENT:   Mouth/Throat: Oropharynx is clear and moist.   Eyes: No scleral icterus.   Neck: No hepatojugular reflux and no JVD present. Carotid bruit is not present. No tracheal deviation present. No thyromegaly present.   Cardiovascular: Normal rate, regular rhythm, normal heart sounds and intact distal pulses. Exam reveals no friction rub.   No murmur heard.  Pulmonary/Chest: Effort normal and breath sounds normal.   Abdominal: Soft. Bowel sounds are normal. She exhibits no distension. There is no tenderness.   Musculoskeletal: She exhibits no edema.   Lymphadenopathy:     She has no cervical adenopathy.   Neurological: She is alert and oriented to person, place, and time.   Skin: Skin is warm, dry and intact. No rash noted. No cyanosis or erythema. No pallor.   Psychiatric: She has a normal mood and affect. Her behavior is normal. Thought content normal.   Vitals reviewed.      Lab and Diagnostic Review:  Lab Results   Component Value " Date    WBC 4.89 11/27/2018    HGB 13.0 11/27/2018    HCT 38.9 11/27/2018    MCV 87.4 11/27/2018     11/27/2018     Lab Results   Component Value Date    GLUCOSE 82 12/04/2019    CALCIUM 9.6 02/26/2020     02/26/2020    K 4.1 02/26/2020    CO2 24.7 02/26/2020    CL 99 02/26/2020    BUN 13 02/26/2020    CREATININE 0.68 02/26/2020    EGFRIFAFRI 108 02/26/2020    EGFRIFNONA 90 02/26/2020    BCR 19.1 02/26/2020    ANIONGAP 12.9 12/04/2019     Lab Results   Component Value Date    TSH 3.480 02/26/2020     Lab Results   Component Value Date    CHOL 226 (H) 12/04/2019    CHOL 209 (H) 11/27/2018     Lab Results   Component Value Date    TRIG 47 12/04/2019    TRIG 43 11/27/2018     Lab Results   Component Value Date    HDL 91 (H) 12/04/2019    HDL 84 (H) 11/27/2018     Lab Results   Component Value Date     (H) 12/04/2019     11/27/2018       Assessment and Plan:         1. Palpitations    - Holter Monitor - 72 Hour Up To 21 Days; [employer] Mini 14 day    2. Shortness of breath    - Adult Stress Echo W/ Cont or Stress Agent if Necessary Per Protocol; full echo  - Holter Monitor - 72 Hour Up To 21 Days; Future    3. Chest discomfort  Family hx of premature CAD 1st degree relative  - Adult Stress Echo W/ Cont or Stress Agent if Necessary Per Protocol; Future    4. Essential hypertension  Start coreg as scheduled after stress completed.  If need to start prior to scheduling then only hold night time dose before testing and do not take morning of    5. Mixed hyperlipidemia  Given info about dash diet.  Recheck with PCP with routine labs.     F/u 6 weeks or sooner if needed.         *Please note that portions of this note were completed with a voice recognition program. Efforts were made to edit the dictations, but occasionally words are mistranscribed.

## 2020-03-20 ENCOUNTER — HOSPITAL ENCOUNTER (OUTPATIENT)
Dept: CARDIOLOGY | Facility: HOSPITAL | Age: 55
Discharge: HOME | End: 2020-03-20
Admitting: NURSE PRACTITIONER

## 2020-03-20 VITALS
SYSTOLIC BLOOD PRESSURE: 146 MMHG | HEIGHT: 68 IN | HEART RATE: 56 BPM | BODY MASS INDEX: 21.82 KG/M2 | WEIGHT: 143.96 LBS | DIASTOLIC BLOOD PRESSURE: 86 MMHG

## 2020-03-20 DIAGNOSIS — R07.89 CHEST DISCOMFORT: ICD-10-CM

## 2020-03-20 DIAGNOSIS — R00.2 PALPITATIONS: ICD-10-CM

## 2020-03-20 DIAGNOSIS — R06.02 SHORTNESS OF BREATH: ICD-10-CM

## 2020-03-20 DIAGNOSIS — R06.02 SHORTNESS OF BREATH: Primary | ICD-10-CM

## 2020-03-20 LAB
BH CV ECHO MEAS - AO MAX PG (FULL): 4.8 MMHG
BH CV ECHO MEAS - AO MAX PG: 7.6 MMHG
BH CV ECHO MEAS - AO MEAN PG (FULL): 3 MMHG
BH CV ECHO MEAS - AO MEAN PG: 4 MMHG
BH CV ECHO MEAS - AO ROOT AREA (BSA CORRECTED): 1.5
BH CV ECHO MEAS - AO ROOT AREA: 5.7 CM^2
BH CV ECHO MEAS - AO ROOT DIAM: 2.7 CM
BH CV ECHO MEAS - AO V2 MAX: 138 CM/SEC
BH CV ECHO MEAS - AO V2 MEAN: 87.9 CM/SEC
BH CV ECHO MEAS - AO V2 VTI: 36.2 CM
BH CV ECHO MEAS - AVA(I,A): 1.6 CM^2
BH CV ECHO MEAS - AVA(I,D): 1.6 CM^2
BH CV ECHO MEAS - AVA(V,A): 1.9 CM^2
BH CV ECHO MEAS - AVA(V,D): 1.9 CM^2
BH CV ECHO MEAS - BSA(HAYCOCK): 1.8 M^2
BH CV ECHO MEAS - BSA: 1.8 M^2
BH CV ECHO MEAS - BZI_BMI: 21.9 KILOGRAMS/M^2
BH CV ECHO MEAS - BZI_METRIC_HEIGHT: 172.7 CM
BH CV ECHO MEAS - BZI_METRIC_WEIGHT: 65.3 KG
BH CV ECHO MEAS - EDV(CUBED): 64.5 ML
BH CV ECHO MEAS - EDV(MOD-SP2): 80 ML
BH CV ECHO MEAS - EDV(MOD-SP4): 97 ML
BH CV ECHO MEAS - EDV(TEICH): 70.4 ML
BH CV ECHO MEAS - EF(CUBED): 62.6 %
BH CV ECHO MEAS - EF(MOD-BP): 62 %
BH CV ECHO MEAS - EF(MOD-SP2): 67.5 %
BH CV ECHO MEAS - EF(MOD-SP4): 61.9 %
BH CV ECHO MEAS - EF(TEICH): 54.6 %
BH CV ECHO MEAS - ESV(CUBED): 24.1 ML
BH CV ECHO MEAS - ESV(MOD-SP2): 26 ML
BH CV ECHO MEAS - ESV(MOD-SP4): 37 ML
BH CV ECHO MEAS - ESV(TEICH): 31.9 ML
BH CV ECHO MEAS - FS: 27.9 %
BH CV ECHO MEAS - IVS/LVPW: 0.72
BH CV ECHO MEAS - IVSD: 0.51 CM
BH CV ECHO MEAS - LA DIMENSION: 2.9 CM
BH CV ECHO MEAS - LA/AO: 1.1
BH CV ECHO MEAS - LAD MAJOR: 4.8 CM
BH CV ECHO MEAS - LAT PEAK E' VEL: 10.7 CM/SEC
BH CV ECHO MEAS - LATERAL E/E' RATIO: 8.3
BH CV ECHO MEAS - LV DIASTOLIC VOL/BSA (35-75): 54.6 ML/M^2
BH CV ECHO MEAS - LV MASS(C)D: 65.8 GRAMS
BH CV ECHO MEAS - LV MASS(C)DI: 37 GRAMS/M^2
BH CV ECHO MEAS - LV MAX PG: 2.8 MMHG
BH CV ECHO MEAS - LV MEAN PG: 1 MMHG
BH CV ECHO MEAS - LV SYSTOLIC VOL/BSA (12-30): 20.8 ML/M^2
BH CV ECHO MEAS - LV V1 MAX: 83.8 CM/SEC
BH CV ECHO MEAS - LV V1 MEAN: 54.9 CM/SEC
BH CV ECHO MEAS - LV V1 VTI: 18.5 CM
BH CV ECHO MEAS - LVIDD: 4 CM
BH CV ECHO MEAS - LVIDS: 2.9 CM
BH CV ECHO MEAS - LVLD AP2: 6.8 CM
BH CV ECHO MEAS - LVLD AP4: 6.7 CM
BH CV ECHO MEAS - LVLS AP2: 5 CM
BH CV ECHO MEAS - LVLS AP4: 5.1 CM
BH CV ECHO MEAS - LVOT AREA (M): 3.1 CM^2
BH CV ECHO MEAS - LVOT AREA: 3.1 CM^2
BH CV ECHO MEAS - LVOT DIAM: 2 CM
BH CV ECHO MEAS - LVPWD: 0.71 CM
BH CV ECHO MEAS - MED PEAK E' VEL: 9.5 CM/SEC
BH CV ECHO MEAS - MEDIAL E/E' RATIO: 9.3
BH CV ECHO MEAS - MV A MAX VEL: 104 CM/SEC
BH CV ECHO MEAS - MV DEC SLOPE: 712 CM/SEC^2
BH CV ECHO MEAS - MV DEC TIME: 0.22 SEC
BH CV ECHO MEAS - MV E MAX VEL: 88.4 CM/SEC
BH CV ECHO MEAS - MV E/A: 0.85
BH CV ECHO MEAS - MV P1/2T MAX VEL: 119 CM/SEC
BH CV ECHO MEAS - MV P1/2T: 49 MSEC
BH CV ECHO MEAS - MVA P1/2T LCG: 1.8 CM^2
BH CV ECHO MEAS - MVA(P1/2T): 4.5 CM^2
BH CV ECHO MEAS - PA ACC SLOPE: 547 CM/SEC^2
BH CV ECHO MEAS - PA ACC TIME: 0.16 SEC
BH CV ECHO MEAS - PA MAX PG: 3.9 MMHG
BH CV ECHO MEAS - PA PR(ACCEL): 5.2 MMHG
BH CV ECHO MEAS - PA V2 MAX: 99.3 CM/SEC
BH CV ECHO MEAS - RAP SYSTOLE: 3 MMHG
BH CV ECHO MEAS - RVSP: 24 MMHG
BH CV ECHO MEAS - SI(AO): 116.6 ML/M^2
BH CV ECHO MEAS - SI(CUBED): 22.7 ML/M^2
BH CV ECHO MEAS - SI(LVOT): 32.7 ML/M^2
BH CV ECHO MEAS - SI(MOD-SP2): 30.4 ML/M^2
BH CV ECHO MEAS - SI(MOD-SP4): 33.8 ML/M^2
BH CV ECHO MEAS - SI(TEICH): 21.6 ML/M^2
BH CV ECHO MEAS - SV(AO): 207.3 ML
BH CV ECHO MEAS - SV(CUBED): 40.3 ML
BH CV ECHO MEAS - SV(LVOT): 58.1 ML
BH CV ECHO MEAS - SV(MOD-SP2): 54 ML
BH CV ECHO MEAS - SV(MOD-SP4): 60 ML
BH CV ECHO MEAS - SV(TEICH): 38.5 ML
BH CV ECHO MEAS - TAPSE (>1.6): 2.4 CM2
BH CV ECHO MEAS - TR MAX PG: 21 MMHG
BH CV ECHO MEAS - TR MAX VEL: 227 CM/SEC
BH CV ECHO MEASUREMENTS AVERAGE E/E' RATIO: 8.75
BH CV STRESS DURATION MIN STAGE 1: 3
BH CV STRESS DURATION SEC STAGE 1: 0
BH CV STRESS GRADE STAGE 1: 10
BH CV STRESS METS STAGE 1: 5
BH CV STRESS PROTOCOL 1: NORMAL
BH CV STRESS SPEED STAGE 1: 1.7
BH CV STRESS STAGE 1: 1
BH CV VAS BP LEFT ARM: NORMAL MMHG
BH CV XLRA - RV BASE: 4.1 CM
BH CV XLRA - RV LENGTH: 6.1 CM
BH CV XLRA - RV MID: 2.6 CM
BH CV XLRA - TDI S': 13.6 CM/SEC
LEFT ATRIUM VOLUME INDEX: 41.6 ML/M^2
LEFT ATRIUM VOLUME: 63 ML
LV EF 2D ECHO EST: 60 %
MAXIMAL PREDICTED HEART RATE: 164 BPM
STRESS BASELINE BP: NORMAL MMHG
STRESS BASELINE HR: 53 BPM
STRESS TARGET HR: 139 BPM

## 2020-03-20 PROCEDURE — 93350 STRESS TTE ONLY: CPT

## 2020-03-20 PROCEDURE — 93306 TTE W/DOPPLER COMPLETE: CPT

## 2020-03-20 PROCEDURE — 93320 DOPPLER ECHO COMPLETE: CPT

## 2020-03-20 PROCEDURE — 93306 TTE W/DOPPLER COMPLETE: CPT | Performed by: INTERNAL MEDICINE

## 2020-03-20 PROCEDURE — 93325 DOPPLER ECHO COLOR FLOW MAPG: CPT

## 2020-03-20 PROCEDURE — 93017 CV STRESS TEST TRACING ONLY: CPT

## 2020-03-20 NOTE — PROGRESS NOTES
Pt with dyspnea and chest pain presented today for stress echo.  Echo tech called with prelim showing pericardial effusion.  Stress portion to not be completed today.  Complete echo and get final read.  POC pending results.

## 2020-03-24 ENCOUNTER — TELEPHONE (OUTPATIENT)
Dept: CARDIOLOGY | Facility: HOSPITAL | Age: 55
End: 2020-03-24

## 2020-03-24 DIAGNOSIS — Z82.49 FAMILY HISTORY OF EARLY CAD: ICD-10-CM

## 2020-03-24 DIAGNOSIS — R07.9 CHEST PAIN ON EXERTION: Primary | ICD-10-CM

## 2020-03-24 DIAGNOSIS — R06.02 SHORTNESS OF BREATH: ICD-10-CM

## 2020-03-24 PROBLEM — R07.89 CHEST DISCOMFORT: Status: ACTIVE | Noted: 2020-03-24

## 2020-03-24 NOTE — TELEPHONE ENCOUNTER
Patient returned called and relayed to patient provider message that echo results were acceptable and treadmill stress  To be scheduled. Patient did want to know if there were any restriction?

## 2020-03-24 NOTE — TELEPHONE ENCOUNTER
Attempted to call patient and discuss echo results.  No answer left message to call back    Echo results acceptable.      Treadmill stress test to be scheduled.

## 2020-03-25 PROCEDURE — 0298T HOLTER MONITOR - 72 HOUR UP TO 21 DAY: CPT | Performed by: INTERNAL MEDICINE

## 2020-03-31 ENCOUNTER — TELEPHONE (OUTPATIENT)
Dept: CARDIOLOGY | Facility: HOSPITAL | Age: 55
End: 2020-03-31

## 2020-03-31 NOTE — TELEPHONE ENCOUNTER
Please call patient with results of her heart monitor.      Patient's heart monitor results have been reviewed.  Her results are acceptable.  Average heart rate 71 bpm.  No significant or concerning arrhythmias.  Her symptoms were associated with a normal rhythm.  Overall normal heart monitor.

## 2020-03-31 NOTE — TELEPHONE ENCOUNTER
Spoke with patient and relayed message per provider.  Results were acceptable average heart rate 71 bpm, no significant  Or concerning arrhythmias,  The symptoms were associated with a normal rhythm, overall a normal heart monitor.

## 2020-04-07 ENCOUNTER — APPOINTMENT (OUTPATIENT)
Dept: CARDIOLOGY | Facility: HOSPITAL | Age: 55
End: 2020-04-07

## 2020-04-14 ENCOUNTER — TELEMEDICINE (OUTPATIENT)
Dept: CARDIOLOGY | Facility: HOSPITAL | Age: 55
End: 2020-04-14

## 2020-04-14 VITALS
DIASTOLIC BLOOD PRESSURE: 65 MMHG | SYSTOLIC BLOOD PRESSURE: 132 MMHG | WEIGHT: 143 LBS | BODY MASS INDEX: 21.67 KG/M2 | HEIGHT: 68 IN | HEART RATE: 67 BPM

## 2020-04-14 DIAGNOSIS — I10 ESSENTIAL HYPERTENSION: ICD-10-CM

## 2020-04-14 DIAGNOSIS — R07.9 CHEST PAIN ON EXERTION: ICD-10-CM

## 2020-04-14 DIAGNOSIS — R00.2 PALPITATIONS: ICD-10-CM

## 2020-04-14 DIAGNOSIS — R06.02 SHORTNESS OF BREATH: ICD-10-CM

## 2020-04-14 PROCEDURE — 99213 OFFICE O/P EST LOW 20 MIN: CPT | Performed by: NURSE PRACTITIONER

## 2020-04-14 NOTE — PROGRESS NOTES
[facility]  [facility]      Encounter Date:04/14/2020     Alexsandra Valdes  [address] [facility] [address]  [unfilled]    1965    Tosha Bruno MD    Alexsandra Valdes is a 54 y.o. female.      Subjective:     Chief Complaint:  Palpitations (and chest pain) and Follow-up     This was an audio enabled telemedicine encounter. Unable to connect with multiple attempts.       HPI     54-year-old female hypertension, polycystic kidney disease, anxiety, palpitations Hyperlipidemia.  Patient has been having increased dyspnea and chest discomfort that have worsened with exercise.  Improved with rest.  No history of CAD or lung disease.  Recent palpitations that were occur from sleep that were described as a fluttering sensation.  On her last office visit she was placed in night extended Holter.  A stress echo was ordered.  Echo was completed but not stress portion due to pericardial effusion per protocol.  After review patient had a normal EF, no significant valvular disease and a minimal pericardial effusion of less than 1 cm.  Patient was started on carvedilol. Treadmill stress was ordered, but not completed due to epidemic.     Pt is taking home -130, HR 50-70.  Palps have continued, but more at rest.  Chest discomfort and dyspnea has improved.      Pt f/u today.     Patient Active Problem List    Diagnosis Date Noted   • Chest discomfort 03/24/2020     Note Last Updated: 3/24/2020     · Echocardiogram completed 3/20/2020: EF 60%, LA mildly dilated, small pericardial effusion, less than 1 cm, no evidence of cardiac tamponade     • Pure hypercholesterolemia 12/04/2019   • Polycystic kidney disease 06/11/2018   • Degeneration, intervertebral disc, lumbar 06/11/2018   • Palpitations 07/15/2013     Note Last Updated: 3/31/2020     · Extended Holter, 3/4/2020: Where x6 days.  Average heart rate 71 bpm.  PACs and PVCs less than 1%.  No significant arrhythmias.  1 SVT run lasting  "4 beats.  symptoms correlated with sinus rhythm.           Past Surgical History:   Procedure Laterality Date   • MOUTH SURGERY     • TUBAL ABDOMINAL LIGATION         No Known Allergies      Current Outpatient Medications:   •  carvedilol (COREG) 3.125 MG tablet, Take 1 tablet by mouth 2 (Two) Times a Day With Meals., Disp: 60 tablet, Rfl: 1  •  Multiple Vitamins-Minerals (MULTIVITAMIN ADULT PO), Take 1 tablet by mouth Daily., Disp: , Rfl:     The following portions of the patient's history were reviewed and updated today during office visit as appropriate: allergies, current medications, past family history, past medical history, past social history, past surgical history and problem list.    Review of Systems   Cardiovascular: Positive for palpitations.       Objective:     Vitals:    04/14/20 1435   BP: 132/65   BP Location: Left arm   Patient Position: Sitting   Cuff Size: Adult   Pulse: 67   Weight: 64.9 kg (143 lb)   Height: 172.7 cm (67.99\")         Physical Exam  A/O answers questions appropriately.  Clear speech.  Nonlabored conversation.    Lab and Diagnostic Review:    Assessment and Plan:         1. Palpitations  Reviewed holter, no concerning arrhythmias.  Mild intermittent palpitations.  Continue beta-blocker    2.  Essential hypertension  Blood pressure better controlled.  Continue Coreg    3.  Chest pain on exertion  Chest pain and dyspnea has resolved.  Patient has scheduled stress test due to family history of premature CAD.      Reviewed echo results.    Patient to follow-up with primary care provider on a routine basis.  Follow-up in the heart valve center as needed.    Unable to complete visit using a video connection to the patient. A phone visit was used to complete this visits. Total time of discussion was 15 minutes.          *Please note that portions of this note were completed with a voice recognition program. Efforts were made to edit the dictations, but occasionally words are " mistranscribed.

## 2020-05-09 DIAGNOSIS — R00.2 PALPITATIONS: ICD-10-CM

## 2020-05-09 DIAGNOSIS — R03.0 ELEVATED BLOOD PRESSURE READING: ICD-10-CM

## 2020-05-11 RX ORDER — CARVEDILOL 3.12 MG/1
TABLET ORAL
Qty: 60 TABLET | Refills: 0 | Status: SHIPPED | OUTPATIENT
Start: 2020-05-11 | End: 2020-06-16 | Stop reason: SDUPTHER

## 2020-05-12 ENCOUNTER — APPOINTMENT (OUTPATIENT)
Dept: CARDIOLOGY | Facility: HOSPITAL | Age: 55
End: 2020-05-12

## 2020-06-01 ENCOUNTER — HOSPITAL ENCOUNTER (OUTPATIENT)
Dept: CARDIOLOGY | Facility: HOSPITAL | Age: 55
Discharge: HOME | End: 2020-06-01
Admitting: NURSE PRACTITIONER

## 2020-06-01 ENCOUNTER — TELEPHONE (OUTPATIENT)
Dept: CARDIOLOGY | Facility: HOSPITAL | Age: 55
End: 2020-06-01

## 2020-06-01 VITALS
HEIGHT: 68 IN | BODY MASS INDEX: 21.82 KG/M2 | DIASTOLIC BLOOD PRESSURE: 80 MMHG | HEART RATE: 65 BPM | WEIGHT: 144 LBS | SYSTOLIC BLOOD PRESSURE: 120 MMHG

## 2020-06-01 DIAGNOSIS — R07.9 CHEST PAIN ON EXERTION: ICD-10-CM

## 2020-06-01 DIAGNOSIS — R06.02 SHORTNESS OF BREATH: ICD-10-CM

## 2020-06-01 DIAGNOSIS — Z82.49 FAMILY HISTORY OF EARLY CAD: ICD-10-CM

## 2020-06-01 LAB
BH CV STRESS BP STAGE 1: NORMAL
BH CV STRESS BP STAGE 2: NORMAL
BH CV STRESS BP STAGE 3: NORMAL
BH CV STRESS BP STAGE 4: NORMAL
BH CV STRESS DURATION MIN STAGE 1: 3
BH CV STRESS DURATION MIN STAGE 2: 3
BH CV STRESS DURATION MIN STAGE 3: 3
BH CV STRESS DURATION MIN STAGE 4: 1
BH CV STRESS DURATION SEC STAGE 4: 10
BH CV STRESS GRADE STAGE 1: 10
BH CV STRESS GRADE STAGE 2: 12
BH CV STRESS GRADE STAGE 3: 14
BH CV STRESS GRADE STAGE 4: 16
BH CV STRESS HR STAGE 1: 105
BH CV STRESS HR STAGE 2: 125
BH CV STRESS HR STAGE 3: 150
BH CV STRESS HR STAGE 4: 173
BH CV STRESS METS STAGE 1: 5
BH CV STRESS METS STAGE 2: 7.5
BH CV STRESS METS STAGE 3: 10
BH CV STRESS METS STAGE 4: 13.5
BH CV STRESS O2 STAGE 1: 98
BH CV STRESS O2 STAGE 2: 99
BH CV STRESS O2 STAGE 3: 98
BH CV STRESS O2 STAGE 4: 98
BH CV STRESS PROTOCOL 1: NORMAL
BH CV STRESS RECOVERY BP: NORMAL MMHG
BH CV STRESS RECOVERY HR: 91 BPM
BH CV STRESS SPEED STAGE 1: 1.7
BH CV STRESS SPEED STAGE 2: 2.5
BH CV STRESS SPEED STAGE 3: 3.4
BH CV STRESS SPEED STAGE 4: 4.2
BH CV STRESS STAGE 1: 1
BH CV STRESS STAGE 2: 2
BH CV STRESS STAGE 3: 3
BH CV STRESS STAGE 4: 4
MAXIMAL PREDICTED HEART RATE: 164 BPM
PERCENT MAX PREDICTED HR: 107.32 %
STRESS BASELINE BP: NORMAL MMHG
STRESS BASELINE HR: 68 BPM
STRESS O2 SAT REST: 99 %
STRESS PERCENT HR: 126 %
STRESS POST ESTIMATED WORKLOAD: 12 METS
STRESS POST EXERCISE DUR MIN: 10 MIN
STRESS POST EXERCISE DUR SEC: 10 SEC
STRESS POST O2 SAT PEAK: 98 %
STRESS POST PEAK BP: NORMAL MMHG
STRESS POST PEAK HR: 176 BPM
STRESS TARGET HR: 139 BPM

## 2020-06-01 PROCEDURE — 93017 CV STRESS TEST TRACING ONLY: CPT

## 2020-06-01 PROCEDURE — 93018 CV STRESS TEST I&R ONLY: CPT | Performed by: INTERNAL MEDICINE

## 2020-06-01 NOTE — TELEPHONE ENCOUNTER
Please call with stress test results:    Pt stress test was acceptable.  No evidence of a heart blockage. No concerning irregular heart rhythms.     F/u with PCP as scheduled.  F/u [facility] as needed.

## 2020-06-01 NOTE — TELEPHONE ENCOUNTER
Spoke with patient and relayed message per provider, stress test was acceptable.  No evidence of heart blockage, no concerning irregular rhythms. Advised to f/u with PCP as scheduled and with H&V as needed. Patient verbalized understanding and had no further questions.

## 2020-06-16 DIAGNOSIS — R00.2 PALPITATIONS: ICD-10-CM

## 2020-06-16 DIAGNOSIS — R03.0 ELEVATED BLOOD PRESSURE READING: ICD-10-CM

## 2020-06-16 RX ORDER — CARVEDILOL 3.12 MG/1
3.12 TABLET ORAL 2 TIMES DAILY WITH MEALS
Qty: 60 TABLET | Refills: 3 | Status: SHIPPED | OUTPATIENT
Start: 2020-06-16 | End: 2020-12-07

## 2020-06-22 ENCOUNTER — OFFICE VISIT (OUTPATIENT)
Dept: FAMILY MEDICINE CLINIC | Facility: CLINIC | Age: 55
End: 2020-06-22

## 2020-06-22 VITALS
DIASTOLIC BLOOD PRESSURE: 82 MMHG | HEIGHT: 68 IN | WEIGHT: 146 LBS | OXYGEN SATURATION: 98 % | SYSTOLIC BLOOD PRESSURE: 128 MMHG | HEART RATE: 66 BPM | BODY MASS INDEX: 22.13 KG/M2

## 2020-06-22 DIAGNOSIS — I10 ESSENTIAL HYPERTENSION: Primary | ICD-10-CM

## 2020-06-22 DIAGNOSIS — R00.2 PALPITATIONS: ICD-10-CM

## 2020-06-22 PROCEDURE — 99213 OFFICE O/P EST LOW 20 MIN: CPT | Performed by: FAMILY MEDICINE

## 2020-06-22 NOTE — PROGRESS NOTES
Chief Complaint   Patient presents with   • Hypertension      Answers for HPI/ROS submitted by the patient on 6/21/2020   Hypertension  What is the primary reason for your visit?: High Blood Pressure    Hypertension   This is a chronic problem. The current episode started more than 1 month ago. The problem has been gradually improving since onset. The problem is controlled. Associated symptoms include headaches. Pertinent negatives include no anxiety, blurred vision, chest pain, malaise/fatigue, neck pain, orthopnea, palpitations, peripheral edema, PND, shortness of breath or sweats. There are no associated agents to hypertension. Risk factors for coronary artery disease include post-menopausal state. Current antihypertension treatment includes beta blockers. There are no compliance problems.       She was told she had PVCs and mild effusion. Anxiety when she feels it. Carvedilol is working. She's tired but other than that she's ok. Home readings 120s mostly, lowest 108-109. She hasn't had carvedilol today. Just started back at gym.         Review of Systems   Constitutional: Positive for fatigue. Negative for malaise/fatigue.   Eyes: Negative for blurred vision.   Respiratory: Negative for shortness of breath.    Cardiovascular: Negative for chest pain, palpitations, orthopnea and PND.   Musculoskeletal: Negative for neck pain.   Psychiatric/Behavioral: The patient is nervous/anxious.         Current Outpatient Medications   Medication Sig Dispense Refill   • carvedilol (COREG) 3.125 MG tablet Take 1 tablet by mouth 2 (Two) Times a Day With Meals. 60 tablet 3   • Multiple Vitamins-Minerals (MULTIVITAMIN ADULT PO) Take 1 tablet by mouth Daily.       No current facility-administered medications for this visit.        No Known Allergies    Past Medical History:   Diagnosis Date   • Anxiety 07/15/2013   • H/O cardiovascular stress test 06/01/2020    normal   • H/O cardiovascular stress test 06/01/2020    normal   •  "Hyperlipidemia 2018   • Low back pain    • Normal Holter exam 03/04/2020   • Palpitations 07/15/2013   • Polycystic kidney    • Polycystic liver disease    • Rotator cuff disorder 11/21/2016   • Shingles    • Skin cancer, basal cell    • Squamous cell skin cancer         Past Surgical History:   Procedure Laterality Date   • MOUTH SURGERY     • TUBAL ABDOMINAL LIGATION          Family History   Problem Relation Age of Onset   • Hypertension Mother    • Heart disease Father    • Polycystic kidney disease Sister    • Other Sister         degenerative disc disease   • Other Brother         degenerative disc disease   • Polycystic kidney disease Brother    • Cancer Son    • Other Son         degenerative disc disease   • Cancer Maternal Grandfather    • Heart disease Paternal Grandmother    • Hypertension Daughter         Social History     Socioeconomic History   • Marital status:      Spouse name: Not on file   • Number of children: Not on file   • Years of education: Not on file   • Highest education level: Not on file   Occupational History   • Occupation:    Tobacco Use   • Smoking status: Never Smoker   • Smokeless tobacco: Never Used   Substance and Sexual Activity   • Alcohol use: No   • Drug use: No   • Sexual activity: Defer     Birth control/protection: Post-menopausal   Social History Narrative    Caffeine coffee 2 servings        Vitals:    06/22/20 0834   BP: 128/82   BP Location: Left arm   Patient Position: Sitting   Cuff Size: Adult   Pulse: 66   SpO2: 98%   Weight: 66.2 kg (146 lb)   Height: 172.7 cm (68\")      Body mass index is 22.2 kg/m².    Physical Exam   Constitutional: No distress.   Cardiovascular: Normal rate and regular rhythm.   No murmur heard.  Pulmonary/Chest: Effort normal and breath sounds normal.   Psychiatric: She has a normal mood and affect. Her behavior is normal. Judgment and thought content normal.   Vitals reviewed.         Study date: 6/1/20   Patient " Information     Patient Name  Alexsandra Valdes MRN  [id number] Sex  Female  (Age)  1965 (54 y.o.)   Interpretation Summary     · Patient denied chest pain or pressure during procedure; she did note dyspnea in Javi protocol stage III with nominal oximetry (98%) and with prompt resolution post exercise.  · Expected exercise duration = 7:20 minutes; actual exercise duration = 10:10 minutes; SHERON(-30).  · THR of 139/minute achieved at 6:20 minutes; patient requested to stop due to fatigue and dyspnea.  · SR with no ST or T wave changes noted; rare - occasional PVC in stress and rare PAC in recovery.  · BMI 21.9.  · Arrhythmias were not significant during stress.  · Nominal room air oximetry before and during exercise (98-99%).  · Acceptable negative exercise stress test without anginal type chest discomfort or ischemic electrocardiographic changes suggestive of low probability for significant focal obstructive coronary artery disease following exercise to 107% predicted maximum heart rate and 130% predicted exercise capacity.     3/20/20   Patient Information     Patient Name  Alexsandra Valdes MRN  [id number] Sex  Female  (Age)  1965 (54 y.o.)   Sedation Narrator Report     Sedation Narrator Report   Interpretation Summary     · Left ventricular systolic function is normal. Estimated EF = 60%.  · Left atrial cavity size is mildly dilated.  · There is a small (<1cm) pericardial effusion. There is no evidence of cardiac tamponade           Alexsandra was seen today for hypertension.    Diagnoses and all orders for this visit:    Essential hypertension  Blood pressure has been in range with home monitoring.  Of note she did not take her Coreg today and blood pressure is within the normal range.  At this time plan to hold Coreg and continue home monitoring.  If blood pressure increases above 140/90 will need to restart medication.  Palpitations  Reviewed cardiac testing as above.  Of note on the Holter  monitor from 3/4/2020 symptoms correlated with normal sinus rhythm.  Discussed use of beta-blocker as needed if palpitations return.  Monitor caffeine use and sleep habits.  Additionally discussed if restarting the beta-blocker it would suppress her maximum heart rate with workouts.      Return for Physical, as scheduled.    Dr. Tosha Fine

## 2020-12-07 ENCOUNTER — OFFICE VISIT (OUTPATIENT)
Dept: FAMILY MEDICINE CLINIC | Facility: CLINIC | Age: 55
End: 2020-12-07

## 2020-12-07 VITALS
BODY MASS INDEX: 21.49 KG/M2 | OXYGEN SATURATION: 98 % | SYSTOLIC BLOOD PRESSURE: 124 MMHG | HEART RATE: 67 BPM | DIASTOLIC BLOOD PRESSURE: 74 MMHG | WEIGHT: 141.8 LBS | HEIGHT: 68 IN

## 2020-12-07 DIAGNOSIS — Z23 NEED FOR VACCINATION: ICD-10-CM

## 2020-12-07 DIAGNOSIS — Z11.1 SCREENING-PULMONARY TB: ICD-10-CM

## 2020-12-07 DIAGNOSIS — Z13.29 SCREENING FOR THYROID DISORDER: ICD-10-CM

## 2020-12-07 DIAGNOSIS — Z00.00 WELL ADULT EXAM: Primary | ICD-10-CM

## 2020-12-07 DIAGNOSIS — E78.00 PURE HYPERCHOLESTEROLEMIA: ICD-10-CM

## 2020-12-07 DIAGNOSIS — Q61.3 POLYCYSTIC KIDNEY DISEASE: ICD-10-CM

## 2020-12-07 DIAGNOSIS — Z13.0 SCREENING FOR DEFICIENCY ANEMIA: ICD-10-CM

## 2020-12-07 LAB
INDURATION: 0 MM (ref 0–10)
Lab: NORMAL
TB SKIN TEST: NEGATIVE

## 2020-12-07 PROCEDURE — 90632 HEPA VACCINE ADULT IM: CPT | Performed by: FAMILY MEDICINE

## 2020-12-07 PROCEDURE — 86580 TB INTRADERMAL TEST: CPT | Performed by: FAMILY MEDICINE

## 2020-12-07 PROCEDURE — 99396 PREV VISIT EST AGE 40-64: CPT | Performed by: FAMILY MEDICINE

## 2020-12-07 PROCEDURE — 90471 IMMUNIZATION ADMIN: CPT | Performed by: FAMILY MEDICINE

## 2020-12-07 NOTE — PROGRESS NOTES
Alexsandra Valdes presents today for a physical    Chief Complaint   Patient presents with   • Annual Exam     patient is fasting this morning for blood work        HPI   She has had first Shingles vaccine and it hurt.     Needs TB screening for employment. No h/o positive TB skin test.     Sometimes night sweats hormonal.     Blood pressure has been great and she's off medication. Anxiety brings it on. Situational anxiety, last PCP had recommended benadryl but it knocks her out.     She's been trying loose weight slow and steady. Watching what she eats with carbs and sweet intake.     Mammogram was normal in the Summer.           Review of Systems   Constitutional: Positive for diaphoresis. Negative for unexpected weight loss.   Respiratory: Negative for cough.    Gastrointestinal: Negative for blood in stool.        No change in bowel habits   Genitourinary: Negative for frequency and urinary incontinence.   Psychiatric/Behavioral: The patient is nervous/anxious.         Patient Active Problem List   Diagnosis   • Polycystic kidney disease   • Degeneration, intervertebral disc, lumbar   • Pure hypercholesterolemia   • Palpitations   • Essential hypertension       Current Outpatient Medications   Medication Sig Dispense Refill   • Multiple Vitamins-Minerals (MULTIVITAMIN ADULT PO) Take 1 tablet by mouth Daily.       No current facility-administered medications for this visit.        No Known Allergies     Past Medical History:   Diagnosis Date   • Anxiety 07/15/2013   • H/O cardiovascular stress test 06/01/2020    normal   • H/O cardiovascular stress test 06/01/2020    normal   • Hyperlipidemia 2018   • Low back pain    • Normal Holter exam 03/04/2020   • Palpitations 07/15/2013   • Polycystic kidney    • Polycystic liver disease    • Rotator cuff disorder 11/21/2016   • Shingles    • Skin cancer, basal cell    • Squamous cell skin cancer         Past Surgical History:   Procedure Laterality Date   • MOUTH  "SURGERY     • TUBAL ABDOMINAL LIGATION          Family History   Problem Relation Age of Onset   • Hypertension Mother    • Heart disease Father    • Polycystic kidney disease Sister    • Other Sister         degenerative disc disease   • Other Brother         degenerative disc disease   • Polycystic kidney disease Brother    • Cancer Son    • Other Son         degenerative disc disease   • Cancer Maternal Grandfather    • Heart disease Paternal Grandmother    • Hypertension Daughter         Social History     Socioeconomic History   • Marital status:      Spouse name: Heri   • Number of children: Not on file   • Years of education: Not on file   • Highest education level: Not on file   Occupational History   • Occupation:    Tobacco Use   • Smoking status: Never Smoker   • Smokeless tobacco: Never Used   Substance and Sexual Activity   • Alcohol use: No   • Drug use: No   • Sexual activity: Defer     Birth control/protection: Post-menopausal   Social History Narrative    Caffeine coffee 2 servings        Vitals:    12/07/20 0803   BP: 124/74   BP Location: Left arm   Patient Position: Sitting   Cuff Size: Adult   Pulse: 67   SpO2: 98%   Weight: 64.3 kg (141 lb 12.8 oz)   Height: 172.7 cm (68\")      Body mass index is 21.56 kg/m².    Patient's Body mass index is 21.56 kg/m². BMI is within normal parameters. No follow-up required..      Physical Exam  Vitals signs reviewed.   Constitutional:       General: She is not in acute distress.     Appearance: She is not ill-appearing.   HENT:      Right Ear: Hearing, tympanic membrane and ear canal normal.      Left Ear: Hearing, tympanic membrane and ear canal normal.   Eyes:      General:         Right eye: No discharge.         Left eye: No discharge.      Conjunctiva/sclera: Conjunctivae normal.   Neck:      Musculoskeletal: Neck supple.      Thyroid: No thyromegaly.   Cardiovascular:      Rate and Rhythm: Normal rate and regular rhythm.      " Heart sounds: No murmur.   Pulmonary:      Effort: Pulmonary effort is normal. No respiratory distress.      Breath sounds: Normal breath sounds.   Abdominal:      General: There is no distension.      Palpations: Abdomen is soft. There is no hepatomegaly.      Tenderness: There is no abdominal tenderness. There is no guarding or rebound.      Hernia: No hernia is present.   Lymphadenopathy:      Cervical: No cervical adenopathy.   Skin:     General: Skin is warm and dry.      Findings: No rash.   Neurological:      Mental Status: She is alert and oriented to person, place, and time.      Gait: Gait normal.   Psychiatric:         Mood and Affect: Mood normal.         Behavior: Behavior normal.         Thought Content: Thought content normal.         Judgment: Judgment normal.          Results for orders placed or performed in visit on 12/07/20   TB Skin Test    Specimen: Blood   Result Value Ref Range    TB Skin Test      Induration      Injection Date & Time          Immunization History   Administered Date(s) Administered   • Flu Vaccine Intradermal Quad 18-64YR 12/21/2012   • Flublock Quad =>18yrs 10/03/2020   • Flulaval/Fluarix/Fluzone Quad 10/24/2018, 12/04/2019   • Hepatitis A 12/04/2019, 12/07/2020   • PPD Test 12/21/2012, 10/22/2018, 12/07/2020   • Shingrix 10/03/2020   • Tdap 12/03/2018       Health Maintenance   Topic Date Due   • MAMMOGRAM  07/17/2020   • ZOSTER VACCINE (2 of 2) 11/28/2020   • LIPID PANEL  12/04/2020   • COLONOSCOPY  01/16/2022   • PAP SMEAR  10/15/2024   • TDAP/TD VACCINES (2 - Td) 12/03/2028   • INFLUENZA VACCINE  Completed       Diagnoses and all orders for this visit:    1. Well adult exam (Primary)  -     CBC & Differential; Future  -     Comprehensive Metabolic Panel; Future  -     Lipid Panel; Future  -     TSH Rfx On Abnormal To Free T4; Future  Mammogram records requested from [facility].  Colonoscopy 2017 normal, repeat at 5-year intervals due to family history of  polyps.  Pap smear 2019 normal repeat at 5-year interval.  2. Pure hypercholesterolemia  -     Comprehensive Metabolic Panel; Future  -     Lipid Panel; Future    3. Polycystic kidney disease  -     Comprehensive Metabolic Panel; Future    4. Screening for deficiency anemia  -     CBC & Differential; Future    5. Screening for thyroid disorder  -     TSH Rfx On Abnormal To Free T4; Future    6. Need for vaccination  -     Hepatitis A Vaccine Adult IM    7. Screening-pulmonary TB  -     TB Skin Test        Counseled on health maintenance topics and preventative care recommendations: Cervical cancer screening, breast cancer screening, colon cancer screening, hepatitis A vaccine, Shingrix vaccine, diet     Return in about 1 year (around 12/7/2021) for Physical.    Electronically signed by Tosha Fine MD, 12/07/20, 8:37 AM EST.

## 2020-12-08 ENCOUNTER — LAB REQUISITION (OUTPATIENT)
Dept: LAB | Facility: HOSPITAL | Age: 55
End: 2020-12-08

## 2020-12-08 ENCOUNTER — LAB (OUTPATIENT)
Dept: LAB | Facility: HOSPITAL | Age: 55
End: 2020-12-08

## 2020-12-08 DIAGNOSIS — Z13.0 SCREENING FOR DEFICIENCY ANEMIA: ICD-10-CM

## 2020-12-08 DIAGNOSIS — Z00.00 ROUTINE GENERAL MEDICAL EXAMINATION AT A HEALTH CARE FACILITY: ICD-10-CM

## 2020-12-08 DIAGNOSIS — Z13.29 SCREENING FOR THYROID DISORDER: ICD-10-CM

## 2020-12-08 DIAGNOSIS — Z00.00 WELL ADULT EXAM: ICD-10-CM

## 2020-12-08 DIAGNOSIS — Q61.3 POLYCYSTIC KIDNEY DISEASE: ICD-10-CM

## 2020-12-08 DIAGNOSIS — E78.00 PURE HYPERCHOLESTEROLEMIA: ICD-10-CM

## 2020-12-08 PROCEDURE — 36415 COLL VENOUS BLD VENIPUNCTURE: CPT

## 2020-12-09 LAB
ALBUMIN SERPL-MCNC: 4.4 G/DL (ref 3.5–5.2)
ALBUMIN/GLOB SERPL: 2.1 G/DL
ALP SERPL-CCNC: 77 U/L (ref 39–117)
ALT SERPL-CCNC: 13 U/L (ref 1–33)
AST SERPL-CCNC: 18 U/L (ref 1–32)
BASOPHILS # BLD AUTO: 0.07 10*3/MM3 (ref 0–0.2)
BASOPHILS NFR BLD AUTO: 1.5 % (ref 0–1.5)
BILIRUB SERPL-MCNC: 0.3 MG/DL (ref 0–1.2)
BUN SERPL-MCNC: 19 MG/DL (ref 6–20)
BUN/CREAT SERPL: 30.2 (ref 7–25)
CALCIUM SERPL-MCNC: 9.2 MG/DL (ref 8.6–10.5)
CHLORIDE SERPL-SCNC: 104 MMOL/L (ref 98–107)
CHOLEST SERPL-MCNC: 210 MG/DL (ref 0–200)
CO2 SERPL-SCNC: 26.9 MMOL/L (ref 22–29)
CREAT SERPL-MCNC: 0.63 MG/DL (ref 0.57–1)
EOSINOPHIL # BLD AUTO: 0.11 10*3/MM3 (ref 0–0.4)
EOSINOPHIL NFR BLD AUTO: 2.4 % (ref 0.3–6.2)
ERYTHROCYTE [DISTWIDTH] IN BLOOD BY AUTOMATED COUNT: 12 % (ref 12.3–15.4)
GLOBULIN SER CALC-MCNC: 2.1 GM/DL
GLUCOSE SERPL-MCNC: 94 MG/DL (ref 65–99)
HCT VFR BLD AUTO: 37.2 % (ref 34–46.6)
HDLC SERPL-MCNC: 88 MG/DL (ref 40–60)
HGB BLD-MCNC: 12.6 G/DL (ref 12–15.9)
IMM GRANULOCYTES # BLD AUTO: 0.01 10*3/MM3 (ref 0–0.05)
IMM GRANULOCYTES NFR BLD AUTO: 0.2 % (ref 0–0.5)
LDLC SERPL CALC-MCNC: 114 MG/DL (ref 0–100)
LYMPHOCYTES # BLD AUTO: 1.28 10*3/MM3 (ref 0.7–3.1)
LYMPHOCYTES NFR BLD AUTO: 28 % (ref 19.6–45.3)
MCH RBC QN AUTO: 29.1 PG (ref 26.6–33)
MCHC RBC AUTO-ENTMCNC: 33.9 G/DL (ref 31.5–35.7)
MCV RBC AUTO: 85.9 FL (ref 79–97)
MONOCYTES # BLD AUTO: 0.37 10*3/MM3 (ref 0.1–0.9)
MONOCYTES NFR BLD AUTO: 8.1 % (ref 5–12)
NEUTROPHILS # BLD AUTO: 2.73 10*3/MM3 (ref 1.7–7)
NEUTROPHILS NFR BLD AUTO: 59.8 % (ref 42.7–76)
NRBC BLD AUTO-RTO: 0 /100 WBC (ref 0–0.2)
PLATELET # BLD AUTO: 210 10*3/MM3 (ref 140–450)
POTASSIUM SERPL-SCNC: 4.3 MMOL/L (ref 3.5–5.2)
PROT SERPL-MCNC: 6.5 G/DL (ref 6–8.5)
RBC # BLD AUTO: 4.33 10*6/MM3 (ref 3.77–5.28)
SODIUM SERPL-SCNC: 139 MMOL/L (ref 136–145)
TRIGL SERPL-MCNC: 42 MG/DL (ref 0–150)
TSH SERPL DL<=0.005 MIU/L-ACNC: 3.46 UIU/ML (ref 0.27–4.2)
VLDLC SERPL CALC-MCNC: 8 MG/DL (ref 5–40)
WBC # BLD AUTO: 4.57 10*3/MM3 (ref 3.4–10.8)

## 2021-02-22 ENCOUNTER — IMMUNIZATION (OUTPATIENT)
Dept: VACCINE CLINIC | Facility: HOSPITAL | Age: 56
End: 2021-02-22

## 2021-02-22 PROCEDURE — 91300 HC SARSCOV02 VAC 30MCG/0.3ML IM: CPT | Performed by: INTERNAL MEDICINE

## 2021-02-22 PROCEDURE — 0001A: CPT | Performed by: INTERNAL MEDICINE

## 2021-03-15 ENCOUNTER — IMMUNIZATION (OUTPATIENT)
Dept: VACCINE CLINIC | Facility: HOSPITAL | Age: 56
End: 2021-03-15

## 2021-03-15 PROCEDURE — 91300 HC SARSCOV02 VAC 30MCG/0.3ML IM: CPT | Performed by: INTERNAL MEDICINE

## 2021-03-15 PROCEDURE — 0002A: CPT | Performed by: INTERNAL MEDICINE

## 2021-12-14 ENCOUNTER — LAB (OUTPATIENT)
Dept: LAB | Facility: HOSPITAL | Age: 56
End: 2021-12-14

## 2021-12-14 ENCOUNTER — OFFICE VISIT (OUTPATIENT)
Dept: FAMILY MEDICINE CLINIC | Facility: CLINIC | Age: 56
End: 2021-12-14

## 2021-12-14 VITALS
HEART RATE: 72 BPM | SYSTOLIC BLOOD PRESSURE: 118 MMHG | DIASTOLIC BLOOD PRESSURE: 60 MMHG | HEIGHT: 66 IN | OXYGEN SATURATION: 99 % | WEIGHT: 145.6 LBS | BODY MASS INDEX: 23.4 KG/M2

## 2021-12-14 DIAGNOSIS — Z00.00 WELL ADULT EXAM: Primary | ICD-10-CM

## 2021-12-14 DIAGNOSIS — E78.00 PURE HYPERCHOLESTEROLEMIA: ICD-10-CM

## 2021-12-14 DIAGNOSIS — Q61.3 POLYCYSTIC KIDNEY DISEASE: ICD-10-CM

## 2021-12-14 DIAGNOSIS — F41.1 GAD (GENERALIZED ANXIETY DISORDER): ICD-10-CM

## 2021-12-14 DIAGNOSIS — Z11.1 SCREENING FOR TUBERCULOSIS: ICD-10-CM

## 2021-12-14 DIAGNOSIS — Z00.00 WELL ADULT EXAM: ICD-10-CM

## 2021-12-14 DIAGNOSIS — Z83.71 FH: COLON POLYPS: ICD-10-CM

## 2021-12-14 LAB
ALBUMIN SERPL-MCNC: 4.6 G/DL (ref 3.5–5.2)
ALBUMIN/GLOB SERPL: 1.8 G/DL
ALP SERPL-CCNC: 67 U/L (ref 39–117)
ALT SERPL-CCNC: 11 U/L (ref 1–33)
AST SERPL-CCNC: 16 U/L (ref 1–32)
BILIRUB SERPL-MCNC: 0.4 MG/DL (ref 0–1.2)
BUN SERPL-MCNC: 15 MG/DL (ref 6–20)
BUN/CREAT SERPL: 19.2 (ref 7–25)
CALCIUM SERPL-MCNC: 9.8 MG/DL (ref 8.6–10.5)
CHLORIDE SERPL-SCNC: 107 MMOL/L (ref 98–107)
CHOLEST SERPL-MCNC: 208 MG/DL (ref 0–200)
CO2 SERPL-SCNC: 27 MMOL/L (ref 22–29)
CREAT SERPL-MCNC: 0.78 MG/DL (ref 0.57–1)
ERYTHROCYTE [DISTWIDTH] IN BLOOD BY AUTOMATED COUNT: 12.5 % (ref 12.3–15.4)
GLOBULIN SER CALC-MCNC: 2.5 GM/DL
GLUCOSE SERPL-MCNC: 99 MG/DL (ref 65–99)
HCT VFR BLD AUTO: 38.8 % (ref 34–46.6)
HDLC SERPL-MCNC: 91 MG/DL (ref 40–60)
HGB BLD-MCNC: 13 G/DL (ref 12–15.9)
LDLC SERPL CALC-MCNC: 108 MG/DL (ref 0–100)
MCH RBC QN AUTO: 29.5 PG (ref 26.6–33)
MCHC RBC AUTO-ENTMCNC: 33.5 G/DL (ref 31.5–35.7)
MCV RBC AUTO: 88 FL (ref 79–97)
PLATELET # BLD AUTO: 223 10*3/MM3 (ref 140–450)
POTASSIUM SERPL-SCNC: 5 MMOL/L (ref 3.5–5.2)
PROT SERPL-MCNC: 7.1 G/DL (ref 6–8.5)
RBC # BLD AUTO: 4.41 10*6/MM3 (ref 3.77–5.28)
SODIUM SERPL-SCNC: 145 MMOL/L (ref 136–145)
TRIGL SERPL-MCNC: 50 MG/DL (ref 0–150)
TSH SERPL DL<=0.005 MIU/L-ACNC: 3.56 UIU/ML (ref 0.27–4.2)
VLDLC SERPL CALC-MCNC: 9 MG/DL (ref 5–40)
WBC # BLD AUTO: 4.33 10*3/MM3 (ref 3.4–10.8)

## 2021-12-14 PROCEDURE — 99396 PREV VISIT EST AGE 40-64: CPT | Performed by: FAMILY MEDICINE

## 2021-12-14 RX ORDER — ESTRADIOL 0.1 MG/G
2 CREAM VAGINAL DAILY PRN
COMMUNITY

## 2021-12-14 RX ORDER — PAROXETINE 10 MG/1
TABLET, FILM COATED ORAL
COMMUNITY
Start: 2021-10-11 | End: 2022-12-15

## 2021-12-14 NOTE — PROGRESS NOTES
"Chief Complaint  Annual Exam (Pt states she is fasting for labs. )    Subjective          Alexsandra Valdes presents to [facility] for   History of Present Illness     Negative screening for tuberculosis, needs form for work in .     She met with OB/GYn for vaginal discomfort, but estradiol was messy. She hasn't started paxil yet. Anxiety increased with menopause. She is high strung. Concerned about side effects weight gain.    She walks everyday. Goal weight 130s, has a few to loose after vacation.         Review of Systems   Constitutional: Negative for appetite change, diaphoresis, fatigue and fever.   Respiratory: Negative for cough.        PHQ-2/PHQ-9 Depression Screening 12/14/2021   Little interest or pleasure in doing things 0   Feeling down, depressed, or hopeless 0   Total Score 0         Objective   Vital Signs:   Vitals:    12/14/21 0803   BP: 118/60   Pulse: 72   SpO2: 99%   Weight: 66 kg (145 lb 9.6 oz)   Height: 167.6 cm (65.98\")   PainSc: 0-No pain     Body mass index is 23.51 kg/m².      Physical Exam  Constitutional:       General: She is not in acute distress.  HENT:      Right Ear: Tympanic membrane and ear canal normal.      Left Ear: Tympanic membrane and ear canal normal.   Eyes:      General:         Right eye: No discharge.         Left eye: No discharge.      Conjunctiva/sclera: Conjunctivae normal.   Neck:      Thyroid: No thyromegaly.   Cardiovascular:      Rate and Rhythm: Normal rate and regular rhythm.   Pulmonary:      Effort: Pulmonary effort is normal.      Breath sounds: Normal breath sounds.   Abdominal:      Palpations: Abdomen is soft.      Tenderness: There is no abdominal tenderness.   Musculoskeletal:      Cervical back: Neck supple.   Lymphadenopathy:      Head:      Right side of head: No submandibular, preauricular or posterior auricular adenopathy.      Left side of head: No submandibular, preauricular or posterior auricular " adenopathy.      Cervical: No cervical adenopathy.   Skin:     General: Skin is warm and dry.   Neurological:      Mental Status: She is alert and oriented to person, place, and time.   Psychiatric:         Mood and Affect: Mood is anxious.         Behavior: Behavior normal.         Thought Content: Thought content normal.         Judgment: Judgment normal.        Result Review :                Immunization History   Administered Date(s) Administered   • COVID-19 (PFIZER) 02/22/2021, 03/15/2021, 10/29/2021   • Flu Vaccine Intradermal Quad 18-64YR 12/21/2012   • FluLaval/Fluarix/Fluzone >6 10/24/2018, 12/04/2019   • Flublock Quad =>18yrs 10/03/2020   • Flublok 18+yrs 10/10/2021   • Hepatitis A 12/04/2019, 12/07/2020   • PPD Test 12/21/2012, 10/22/2018, 12/07/2020   • Shingrix 10/03/2020, 12/18/2020   • Tdap 12/03/2018       Health Maintenance   Topic Date Due   • LIPID PANEL  12/08/2021   • MAMMOGRAM  07/22/2022   • PAP SMEAR  10/15/2024   • TDAP/TD VACCINES (2 - Td or Tdap) 12/03/2028   • INFLUENZA VACCINE  Completed   • ZOSTER VACCINE  Completed            Assessment and Plan    Diagnoses and all orders for this visit:    1. Well adult exam (Primary)  -     CBC (No Diff); Future  -     Comprehensive Metabolic Panel; Future  -     TSH Rfx On Abnormal To Free T4; Future  -     Lipid Panel; Future  Check fasting labs today.  Continue well woman care with OB/GYN.  Breast cancer screening up-to-date.  Colon cancer screening up-to-date.  2. Polycystic kidney disease  -     Comprehensive Metabolic Panel; Future  Check kidney function annually.  3. Pure hypercholesterolemia  -     Lipid Panel; Future  Not currently on medication. Lifestyle modifications with healthy diet and exercise.  4. Screening for tuberculosis  Form completed for employment and scanned into chart. She simply needed screening and no testing at this time.  5. FH: colon polyps  Last colonoscopy 2016 recommended 5 to 7-year repeat with family history of  colon polyps and her colonoscopy otherwise normal. Patient would prefer 7-year interval.  6. ALTA (generalized anxiety disorder)  OB/GYN prescribed paroxetine but she has not started yet. Discussed benefits of SSRI. If she starts paroxetine and has side effects or is not improving schedule a follow-up visit with me for anxiety and we can discuss other medication options.      Counseled on health maintenance topics and preventative care recommendations: Breast cancer screening, colon cancer screening, tuberculosis screening      Follow Up   Return in about 1 year (around 12/14/2022) for Physical and fasting labs.  Patient was given instructions and counseling regarding her condition or for health maintenance advice. Please see specific information pulled into the AVS if appropriate.      Electronically signed by Tosha Fine MD, 12/14/21, 8:49 AM EST.

## 2022-08-14 ENCOUNTER — E-VISIT (OUTPATIENT)
Dept: FAMILY MEDICINE CLINIC | Facility: TELEHEALTH | Age: 57
End: 2022-08-14

## 2022-08-14 PROCEDURE — BRIGHTMDVISIT: Performed by: NURSE PRACTITIONER

## 2022-08-14 NOTE — E-VISIT TREATED
Chief Complaint: Bladder infection (UTI)   Patient introduction   Patient is 56-year-old female. Patient provided the following organ inventory: Presence of a vagina, ovaries, a uterus, and breasts.   Patient has had dysuria, frequent urination, and urinary urgency for 1 to 3 days.   Urine is clear with a strong or pungent odor.   Patient-submitted comments Patient writes: No, just my symptoms. We returned from [place] and I began symptoms of a UTI/Bladder infection. Pain and frequent urinating. .   Patient did not request an excuse note.   General presentation   Patient has not had a fever. No nausea or vomiting.   Moderate abdominal or pelvic pain.   No back pain.   No flank pain. Difficulty starting, stopping, or delaying urination.   Vaginal symptoms include:    Pain during sex   Tried acetaminophen for their current symptoms. They found it unhelpful.   Previous history of UTI. Current symptoms feel exactly the same as previous UTIs. Received treatment for UTI 0 times in last year.   Uncertain whether treated past UTIs with any of the following: amoxicillin-clavulanate, cefdinir, cefuroxime, cephalexin, ciprofloxacin, fosfomycin, nitrofurantoin monohydrate, TMP/SMX, or trimethoprim.   No known history of yeast infections as a result of taking antibiotics for past UTIs.   Had sexual intercourse in the past week. Does not use diaphragm. No unprotected sexual intercourse with a new partner in the last 2 weeks. Has not been exposed to sexually transmitted infections in the last month.   Patient is not being treated for diabetes mellitus.   Review of red flags/alarm symptoms:    No recent hospitalizations or nursing home care (last 3 months)    No history of renal failure    No history of pyelonephritis    No history of nephrolithiasis    No recent history of urologic instrumentation    No anatomic abnormalities of the urinary tract    No abnormal vaginal discharge    No visible vaginal sores    No abnormal vaginal  bleeding or spotting   Pregnancy/menstrual status/breastfeeding:   Patient is postmenopausal.   Current medications   Not taking other medications or supplements.   Medication allergies   None.   Medication contraindication review   Not taking ACE inhibitors and ARBs.   Risk factors for antibiotic resistance: Travel to [place] within the last 3 months.   No history of anaphylactic reaction to beta-lactams; folate deficiency; G6PD deficiency; arrhythmia; coronary artery disease; megaloblastic anemia; mononucleosis; myasthenia gravis; cholestatic jaundice; oliguria/anuria; and TMP/SMX-associated thrombocytopenia.   No known history of amoxicillin-clavulanate-associated cholestatic jaundice or nitrofurantoin-associated cholestatic jaundice.   Past medical history   Immune conditions: No immunocompromising conditions. No history of cancer.   Assessment   Uncomplicated acute UTI.   This is the likely diagnosis based on patient's symptoms and history, including:    Previous history of UTI    Current symptoms are exactly the same as previous UTIs    Moderate pelvic or abdominal pain that interferes with daily tasks    Recent history of delaying urination    Avoiding food or drink to decrease frequency of urination   Plan   Medications:    phenazopyridine 200 mg tablet RX 200mg 1 tab PO tid PRN 2d for pain or discomfort associated with your condition. Amount is 6 tab.    nitrofurantoin monohydrate/macrocrystals 100 mg capsule RX 100mg 1 cap PO q12h 5d for infection. This medication is an antibiotic. Take it exactly as directed. You must finish the entire course of medication, even if you feel better after taking the first few doses. Amount is 10 cap.   The patient's prescriptions will be sent to:   [id number]   [address] [place] [address] [id number]   Phone: [phone]     Fax: [phone]   Education:    Condition and causes    Prevention    Treatment and self-care    When to call provider    Follow-up:   Patient to follow up as needed for progression or lack of improvement in symptoms within 3d.   ----------   Electronically signed by KAMRYN Mills on 2022-08-14 at 09:05AM   ----------   Patient Interview Transcript:   Knowing about your anatomy is important for diagnosing and treating UTIs. The gender we have on file for you is female, but we realize that this might not tell the whole story. Would you like to tell us more about your anatomy?    Yes   Not selected:    No   OK, which of these do you have? Select all that apply.    Vagina    Ovaries    Uterus    Breasts   Not selected:    Penis    Testes    Prostate   Which of these symptoms do you have? Select all that apply.    Pain or burning while urinating    Frequent urination    Sudden urge to urinate and it's hard to hold the urine in   How long have you had these symptoms? Select one.    1 to 3 days   Not selected:    Less than 24 hours    4 to 6 days    7 to 10 days    More than 10 days   Since your current symptoms started, has it been difficult to start, stop, or delay urination? Select one.    Yes   Not selected:    No   What color is your urine? Select one.    Clear   Not selected:    Cloudy    Yellow    Pink or red   Does your urine smell strange (like ammonia) or stronger than usual? Select one.    Yes   Not selected:    No   Do you also have any of these symptoms? Select all that apply.    Pain, pressure, or discomfort in the lower abdomen   Not selected:    Fever    Nausea    Vomiting    Back pain    No   How would you describe your lower abdominal pain, pressure, or discomfort? Select one.    Moderate; it's uncomfortable and gets in the way of doing daily tasks   Not selected:    Mild; I only notice it when I pay attention to it    Severe; I can't get comfortable, and it stops me from doing daily tasks   Do you have any flank pain? The flank is the side of the body between the ribs and the hips.    No   Not selected:    Yes,  in my left flank    Yes, in my right flank    Yes, in both my left and right flanks   Do you have any of these vaginal symptoms? Select all that apply.    Pain during sex   Not selected:    Abnormal vaginal itching    Unscheduled or abnormal vaginal bleeding or spotting    Visible sores on the vagina    Abnormal vaginal discharge    No   In the past 2 weeks, have you had a medical device or instrument placed in your urinary tract? Examples include catheters, stents, and nephrostomy tubes. Select one.    No   Not selected:    Yes   Have you recently been hospitalized or been a resident of a nursing home or other long-term care facility? This doesn't include emergency room (ER) visits. Select one.    No   Not selected:    Yes, within the last 2 weeks    Yes, within the last 3 months   Have you ever had severe problems with your kidneys, such as kidney failure? Select one.    No   Not selected:    Yes   Have you ever had a kidney infection (pyelonephritis)? Select one.    No   Not selected:    Yes, within the last year    Yes, over a year ago   Have you ever had kidney stones? Select one.    No   Not selected:    Yes, within the last year    Yes, over a year ago   Have you ever been diagnosed with any of these? Select all that apply.    No   Not selected:    Urinary reflux    Bladder diverticula    Single (or horseshoe) kidney    Duplicated urethra   Have you recently held your urine for a long time after you felt the urge to go? Select one.    Yes   Not selected:    No   Have you recently avoided eating or drinking so you wouldn't have the urge to urinate as often? Select one.    Yes   Not selected:    No   Do you use a diaphragm? Select one.    No   Not selected:    Yes   Have you gone through menopause? Select one.    Yes   Not selected:    No    I'm going through it now   Have you had sexual intercourse in the past week? Recent sexual intercourse is a risk factor for urinary tract infections. Select one.    Yes    Not selected:    No   Have you been exposed to a sexually transmitted infection (STI or STD) in the last month? Examples include chlamydia, gonorrhea, trichomoniasis, and herpes. Select one.    No, not that I know of   Not selected:    Yes   Have you had unprotected sexual intercourse with a new partner in the last 2 weeks? Select one.    No   Not selected:    Yes   Have you traveled to any of these countries within the last 3 months? Recent travel to these countries may affect which medication we recommend for your symptoms. Select all that apply.    [place]   Not selected:    [place]    [place]    [place]    None of these   Have you taken any of these medications for your current symptoms? Select any that may apply.    Acetaminophen (Tylenol)   Not selected:    Ibuprofen (Advil, Motrin)    Phenazopyridine (Azo, Baridium, Pyridium, Uricalm, Uristat)    No   Did acetaminophen work well for you? Select one.    No   Not selected:    Yes   Have you ever had a urinary tract infection (UTI)? A UTI is often called a bladder infection or acute cystitis. Select one.    Yes   Not selected:    No, not that I know of   How much do your current symptoms feel like past UTIs? Select one.    Exactly the same   Not selected:    Mostly the same    Somewhat the same    Totally different   In the past year, how many times have you taken antibiotics for a UTI? Select one.    0   Not selected:    1 to 3    4 or more   Which of these antibiotics have you taken for UTIs in the past? Select all that apply.    I'm not sure   Not selected:    Amoxicillin-clavulanate (Augmentin)    Cefdinir    Cefuroxime    Cephalexin (Keflex)    Ciprofloxacin (Cipro)    Fosfomycin (Monurol)    Nitrofurantoin (Macrobid, Macrodantin)    Sulfamethoxazole/trimethoprim, also known as TMP/SMX (Bactrim, Bactrim DS)    Trimethoprim (Primsol)    Other (specify)   Have you ever developed a yeast infection as a result of taking antibiotics? Select one.    No, not that I know  of   Not selected:    Yes   UTIs may be more serious when other factors are present. Let's address those now. Are you being treated for type 1 or type 2 diabetes? Select one.    No   Not selected:    Yes   Do you have any of these conditions that can affect the immune system? Scroll to see all options. Select all that apply.    None of these   Not selected:    History of bone marrow transplant    Chronic kidney disease    Chronic liver disease (including cirrhosis)    HIV/AIDS    Inflammatory bowel disease (Crohn's disease or ulcerative colitis)    Lupus    Moderate to severe plaque psoriasis    Multiple sclerosis    Rheumatoid arthritis    Sickle cell anemia    Alpha or beta thalassemia    History of solid organ transplant (kidney, liver, or heart)    History of spleen removal    An autoimmune disorder not listed here    A condition requiring treatment with long-term use of oral steroids (such as prednisone, prednisolone, or dexamethasone)   Have you ever been diagnosed with cancer? Select one.    No   Not selected:    Yes, I have cancer now    Yes, but I'm in remission   These last few questions will help us create the right treatment plan for you. Are you being treated for any of these conditions? Select all that apply.    No   Not selected:    Kirstie-Danlos syndrome    Folate deficiency    G6PD deficiency    High blood pressure    History of aortic aneurysm or dissection    Marfan syndrome    Megaloblastic anemia    Mono (mononucleosis)    Myasthenia gravis    Oliguria or anuria    Peripheral vascular disease   Have you ever had jaundice as a result of taking amoxicillin-clavulanate (Augmentin) or nitrofurantoin (Macrobid)? Select all that apply.    No   Not selected:    Yes, from amoxicillin-clavulanate (Augmentin)    Yes, from nitrofurantoin (Macrobid, Macrodantin)   Are you taking any of these medications? Select all that apply.    No   Not selected:    An ACE inhibitor such as lisinopril, enalapril,  captopril, or benazepril    An angiotensin II receptor blocker (ARB) such as candesartan, irbesartan, losartan, or valsartan   Are you taking any other medications, vitamins, or supplements? Select one.    No   Not selected:    Yes   Have you ever had an allergic or bad reaction to any medication? Select one.    No   Not selected:    Yes   Do you need a doctor's note? A doctor's note confirms that you received care today and states when you can return to school or work. It does not contain information about your diagnosis or treatment plan. Your provider will make the final decision on whether to give you a doctor's note. Doctor's notes CANNOT be backdated. Select one.    No   Not selected:    Today only (1 day)   Is there anything else you'd like to tell us about your symptoms?    No, just my symptoms. We returned from [place] and I began symptoms of a UTI/Bladder infection. Pain and frequent urinating.   ----------   Medical history   Medical history data does not currently exist for this patient.

## 2022-08-14 NOTE — EXTERNAL PATIENT INSTRUCTIONS
Note   Drink plenty of water and avoid caffeine If symptoms do not improve in 3-5 days follow up with your primary care provider or urgent care   Diagnosis   Urinary tract infection (UTI)   My name is Heather Tracy. I'm a healthcare provider at [facility]. After reviewing your interview, I see you have a urinary tract infection (UTI).   Medications   Your pharmacy   [id number] [address] [place] [address] [phone]     Prescription   Phenazopyridine (200mg): Take 1 tablet by mouth three times a day as needed for 2 days for pain or discomfort associated with your condition.   Nitrofurantoin monohydrate/macrocrystalline (100mg): Take 1 tablet by mouth every 12 hours for 5 days for infection. This medication is an antibiotic. Take it exactly as directed. You must finish the entire course of medication, even if you feel better after taking the first few doses.    I've given you a prescription dose of phenazopyridine. If it's more affordable or convenient, you may use the equivalent amount of non-prescription phenazopyridine. For example, instead of taking one 200 mg phenazopyridine tablet, you may take two 95 mg phenazopyridine tablets.   About your diagnosis   A UTI is an infection of one or more parts of the urinary tract, most commonly the bladder.   Most UTIs are caused by bacteria (usually E. coli) that travel up the urethra and into the bladder. I see that you have some common signs and symptoms of a UTI:    Pain or burning while urinating    Frequent urination    Sudden urge to urinate    Symptoms that feel a lot like past UTIs    Mild or moderate pain, pressure, or discomfort in your lower abdomen    Strange or strong smelling urine    Symptoms that began shortly after sexual intercourse   Fortunately, most UTIs aren't serious, and they're easily treated with antibiotics. Make sure you take all of the antibiotic pills given to you, even if you start to feel better  after the first few doses. Otherwise, the UTI might come back.   What to expect   If you follow this treatment plan, you should start to feel better within 1 to 2 days.   When to seek care   Call us at [phone]   with any sudden or unexpected symptoms.    Symptoms that don't improve or get worse in the next 48 hours    Fever that goes above 101F or lasts longer than 24 hours    Shaking or chills    Nausea or vomiting    Severe flank pain (pain in your back or side)   Other treatment    Rest and drink plenty of water    Urinate frequently and when you first feel the urge    Place a heating pad on your back or stomach to help relieve some of the discomfort   Prevention    Drink a lot of liquids to help flush bacteria from your system. Water is best. Try for six to eight, 8-ounce glasses a day on a regular basis.    Urinate often and when you first feel the urge. Bacteria can grow when urine stays in the bladder too long. Urinate after sex to flush away bacteria.    After using the toilet, always wipe from front to back. This step is most important after a bowel movement. Wiping from front to back prevents bacteria normally found in stool from entering the urinary tract.   Your provider   Your diagnosis was provided by Heather Tracy, a member of your trusted care team at [facility].   If you have any questions, call us at [phone]  .

## 2022-09-06 ENCOUNTER — E-VISIT (OUTPATIENT)
Dept: FAMILY MEDICINE CLINIC | Facility: TELEHEALTH | Age: 57
End: 2022-09-06

## 2022-09-06 PROCEDURE — BRIGHTMDVISIT: Performed by: NURSE PRACTITIONER

## 2022-09-06 NOTE — E-VISIT TREATED
Chief Complaint: Bladder infection (UTI)   Patient introduction   Patient is 56-year-old female.   Patient has had dysuria, frequent urination, and urinary urgency for less than 24 hours.   Urine is clear with no unusual odor.   Warning. The following may warrant further investigation:    Took an unknown antibiotic for UTI less than 1 month ago   Patient-submitted comments Patient writes: I started with frequent painful urination again today. I completed all of the previous antibiotic and I did great. The infection seemed so much better within about 48 hours. Today my symptoms began to return. I do NOT need any of the pain pills that were prescribed last time. I still have a few of those left. Thank you so much for reviewing this once again. I just want it to go away for good:). I'm not sure why it returned again so quickly. I did follow all the directions from my last e visit. .   Patient did not request an excuse note.   General presentation   Patient has not had a fever. No nausea or vomiting.   Mild abdominal or pelvic pain.   No back pain.   No flank pain. Difficulty starting, stopping, or delaying urination.   Vaginal symptoms include:    Pain during sex   Not taking OTC acetaminophen, ibuprofen, or phenazopyridine for current symptoms.   Previous history of UTI. Current symptoms feel exactly the same as previous UTIs. Received treatment for UTI 1 to 3 times in last year.   Took an unknown antibiotic for UTI less than 1 month ago.   No history of pyelonephritis. No history of kidney stones.   Uncertain whether treated past UTIs with any of the following: amoxicillin-clavulanate, cefdinir, cefuroxime, cephalexin, ciprofloxacin, fosfomycin, nitrofurantoin monohydrate, TMP/SMX, or trimethoprim.   No known history of yeast infections as a result of taking antibiotics for past UTIs.   No sexual intercourse in the past week. Does not use diaphragm. No unprotected sexual intercourse with a new partner in the last  2 weeks. Has not been exposed to sexually transmitted infections in the last month.   Patient is not being treated for diabetes mellitus.   Review of red flags/alarm symptoms:    No recent hospitalizations or nursing home care (last 3 months)    No history of renal failure    No recent history of urologic instrumentation    No anatomic abnormalities of the urinary tract    No abnormal vaginal discharge    No visible vaginal sores    No abnormal vaginal bleeding or spotting   Pregnancy/menstrual status/breastfeeding:   Patient is postmenopausal.   Current medications   Not taking other medications or supplements.   Medication allergies   None.   Medication contraindication review   Not taking ACE inhibitors and ARBs.   Risk factors for antibiotic resistance: Travel to [place] within the last 3 months.   No history of anaphylactic reaction to beta-lactams; folate deficiency; G6PD deficiency; arrhythmia; coronary artery disease; megaloblastic anemia; mononucleosis; myasthenia gravis; cholestatic jaundice; oliguria/anuria; and TMP/SMX-associated thrombocytopenia.   No known history of amoxicillin-clavulanate-associated cholestatic jaundice or nitrofurantoin-associated cholestatic jaundice.   Past medical history   Immune conditions: No immunocompromising conditions. No history of cancer.   Assessment   Uncomplicated acute UTI.   This is the likely diagnosis based on patient's symptoms and history, including:    Previous history of UTI    Current symptoms are exactly the same as previous UTIs    Mild pelvic or abdominal pain    Recent history of delaying urination   Plan   Medications:    sulfamethoxazole 800 mg-trimethoprim 160 mg tablet RX 160mg/800mg 1 tab PO q12h 3d for infection. This medication is an antibiotic. Take it exactly as directed. You must finish the entire course of medication, even if you feel better after taking the first few doses. Amount is 6 tab.   The patient's prescription will be sent to:    [id number]   [address] [facility] [id number]   Phone: [phone]     Fax: [phone]   Education:    Condition and causes    Prevention    Treatment and self-care    When to call provider   Follow-up:   Patient to follow up as needed for progression or lack of improvement in symptoms within 3d.   ----------   Electronically signed by KAMRYN Akins on 2022-09-06 at 17:41PM   ----------   Patient Interview Transcript:   Knowing about your anatomy is important for diagnosing and treating UTIs. The gender we have on file for you is female, but we realize that this might not tell the whole story. Would you like to tell us more about your anatomy?    No   Not selected:    Yes   Which of these symptoms do you have? Select all that apply.    Pain or burning while urinating    Frequent urination    Sudden urge to urinate and it's hard to hold the urine in   How long have you had these symptoms? Select one.    Less than 24 hours   Not selected:    1 to 3 days    4 to 6 days    7 to 10 days    More than 10 days   Since your current symptoms started, has it been difficult to start, stop, or delay urination? Select one.    Yes   Not selected:    No   What color is your urine? Select one.    Clear   Not selected:    Cloudy    Yellow    Pink or red   Does your urine smell strange (like ammonia) or stronger than usual? Select one.    No   Not selected:    Yes   Do you also have any of these symptoms? Select all that apply.    Pain, pressure, or discomfort in the lower abdomen   Not selected:    Fever    Nausea    Vomiting    Back pain    No   How would you describe your lower abdominal pain, pressure, or discomfort? Select one.    Mild; I only notice it when I pay attention to it   Not selected:    Moderate; it's uncomfortable and gets in the way of doing daily tasks    Severe; I can't get comfortable, and it stops me from doing daily tasks   Do you have any flank pain? The flank is the side  of the body between the ribs and the hips.    No   Not selected:    Yes, in my left flank    Yes, in my right flank    Yes, in both my left and right flanks   Do you have any of these vaginal symptoms? Select all that apply.    Pain during sex   Not selected:    Abnormal vaginal itching    Unscheduled or abnormal vaginal bleeding or spotting    Visible sores on the vagina    Abnormal vaginal discharge    No   In the past 2 weeks, have you had a medical device or instrument placed in your urinary tract? Examples include catheters, stents, and nephrostomy tubes. Select one.    No   Not selected:    Yes   Have you recently been hospitalized or been a resident of a nursing home or other long-term care facility? This doesn't include emergency room (ER) visits. Select one.    No   Not selected:    Yes, within the last 2 weeks    Yes, within the last 3 months   Have you ever had severe problems with your kidneys, such as kidney failure? Select one.    No   Not selected:    Yes   Kidney stones    No   Not selected:    Within the last year    More than a year ago   Kidney infection (pyelonephritis)    No   Not selected:    Within the last year    More than a year ago   Have you ever been diagnosed with any of these? Select all that apply.    No   Not selected:    Urinary reflux    Bladder diverticula    Single (or horseshoe) kidney    Duplicated urethra   Have you recently held your urine for a long time after you felt the urge to go? Select one.    Yes   Not selected:    No   Have you recently avoided eating or drinking so you wouldn't have the urge to urinate as often? Select one.    No   Not selected:    Yes   Do you use a diaphragm? Select one.    No   Not selected:    Yes   Have you gone through menopause? Select one.    Yes   Not selected:    No    I'm going through it now   Have you had sexual intercourse in the past week? Recent sexual intercourse is a risk factor for urinary tract infections. Select one.    No   Not  selected:    Yes   Have you been exposed to a sexually transmitted infection (STI or STD) in the last month? Examples include chlamydia, gonorrhea, trichomoniasis, and herpes. Select one.    No, not that I know of   Not selected:    Yes   Have you had unprotected sexual intercourse with a new partner in the last 2 weeks? Select one.    No   Not selected:    Yes   Have you traveled to any of these countries within the last 3 months? Recent travel to these countries may affect which medication we recommend for your symptoms. Select all that apply.    [place]   Not selected:    [place]    [place]    [place]    None of these   Have you taken any of these medications for your current symptoms? Select any that may apply.    No   Not selected:    Acetaminophen (Tylenol)    Ibuprofen (Advil, Motrin)    Phenazopyridine (Azo, Baridium, Pyridium, Uricalm, Uristat)   Have you ever had a urinary tract infection (UTI)? A UTI is often called a bladder infection or acute cystitis. Select one.    Yes   Not selected:    No, not that I know of   How much do your current symptoms feel like past UTIs? Select one.    Exactly the same   Not selected:    Mostly the same    Somewhat the same    Totally different   In the past year, how many times have you taken antibiotics for a UTI? Select one.    1 to 3   Not selected:    0    4 or more   When did you last take antibiotics for a UTI? Select one.    Less than 1 month ago   Not selected:    More than 1 month ago   Which antibiotics did you take for your last UTI? Since it was so recent, we need to know which antibiotic(s) you took. You may not get the same medication this time. But this information helps your provider choose the best treatment for you. Select all that apply.    I'm not sure   Not selected:    Amoxicillin-clavulanate (Augmentin)    Cefdinir    Cefuroxime    Cephalexin (Keflex)    Ciprofloxacin (Cipro)    Fosfomycin (Monurol)    Nitrofurantoin (Macrobid, Macrodantin)     Sulfamethoxazole/trimethoprim, also known as TMP/SMX (Bactrim, Bactrim DS)    Trimethoprim (Primsol)    Other (specify)   Which of these antibiotics have you taken for UTIs in the past? Select all that apply.    I'm not sure   Not selected:    Amoxicillin-clavulanate (Augmentin)    Cefdinir    Cefuroxime    Cephalexin (Keflex)    Ciprofloxacin (Cipro)    Fosfomycin (Monurol)    Nitrofurantoin (Macrobid, Macrodantin)    Sulfamethoxazole/trimethoprim, also known as TMP/SMX (Bactrim, Bactrim DS)    Trimethoprim (Primsol)    Other (specify)   Have you ever developed a yeast infection as a result of taking antibiotics? Select one.    No, not that I know of   Not selected:    Yes   UTIs may be more serious when other factors are present. Let's address those now. Are you being treated for type 1 or type 2 diabetes? Select one.    No   Not selected:    Yes   Do you have any of these conditions that can affect the immune system? Scroll to see all options. Select all that apply.    None of these   Not selected:    History of bone marrow transplant    Chronic kidney disease    Chronic liver disease (including cirrhosis)    HIV/AIDS    Inflammatory bowel disease (Crohn's disease or ulcerative colitis)    Lupus    Moderate to severe plaque psoriasis    Multiple sclerosis    Rheumatoid arthritis    Sickle cell anemia    Alpha or beta thalassemia    History of solid organ transplant (kidney, liver, or heart)    History of spleen removal    An autoimmune disorder not listed here    A condition requiring treatment with long-term use of oral steroids (such as prednisone, prednisolone, or dexamethasone)   Have you ever been diagnosed with cancer? Select one.    No   Not selected:    Yes, I have cancer now    Yes, but I'm in remission   These last few questions will help us create the right treatment plan for you. Are you being treated for any of these conditions? Select all that apply.    No   Not selected:    Kirstie-Danlos  syndrome    Folate deficiency    G6PD deficiency    High blood pressure    History of aortic aneurysm or dissection    Marfan syndrome    Megaloblastic anemia    Mono (mononucleosis)    Myasthenia gravis    Oliguria or anuria    Peripheral vascular disease   Have you ever had jaundice as a result of taking amoxicillin-clavulanate (Augmentin) or nitrofurantoin (Macrobid)? Select all that apply.    No   Not selected:    Yes, from amoxicillin-clavulanate (Augmentin)    Yes, from nitrofurantoin (Macrobid, Macrodantin)   Are you taking any of these medications? Select all that apply.    No   Not selected:    An ACE inhibitor such as lisinopril, enalapril, captopril, or benazepril    An angiotensin II receptor blocker (ARB) such as candesartan, irbesartan, losartan, or valsartan   Are you taking any other medications, vitamins, or supplements? Select one.    No   Not selected:    Yes   Have you ever had an allergic or bad reaction to any medication? Select one.    No   Not selected:    Yes   Do you need a doctor's note? A doctor's note confirms that you received care today and states when you can return to school or work. It does not contain information about your diagnosis or treatment plan. Your provider will make the final decision on whether to give you a doctor's note. Doctor's notes CANNOT be backdated. Select one.    No   Not selected:    Today only (1 day)   Is there anything else you'd like to tell us about your symptoms?    I started with frequent painful urination again today. I completed all of the previous antibiotic and I did great. The infection seemed so much better within about 48 hours. Today my symptoms began to return. I do NOT need any of the pain pills that were prescribed last time. I still have a few of those left. Thank you so much for reviewing this once again. I just want it to go away for good:). I'm not sure why it returned again so quickly. I did follow all the directions from my last e  visit.   ----------   Medical history   Medical history data does not currently exist for this patient.

## 2022-09-06 NOTE — EXTERNAL PATIENT INSTRUCTIONS
Note   If you have a recurrent UTI within the next 6-8 weeks, you will have to go into Urgent Care or your PCP for in-person visit to have a Urinalysis with culture to see why the frequent UTIs. Take probiotic for the next month or so to add back the good marino to the gut and vaginal tract.   Diagnosis   Urinary tract infection (UTI)   My name is Jacquelyn Eng. I'm a healthcare provider at [facility]. After reviewing your interview, I see you have a urinary tract infection (UTI).   Medications   Your pharmacy   [id number] [address] [place] [phone]     Prescription   TMP/SMX (160mg/800mg): Take 1 tablet by mouth every 12 hours for 3 days for infection. This medication is an antibiotic. Take it exactly as directed. You must finish the entire course of medication, even if you feel better after taking the first few doses.   About your diagnosis   A UTI is an infection of one or more parts of the urinary tract, most commonly the bladder.   Most UTIs are caused by bacteria (usually E. coli) that travel up the urethra and into the bladder. I see that you have some common signs and symptoms of a UTI:    Pain or burning while urinating    Frequent urination    Sudden urge to urinate    Symptoms that feel a lot like past UTIs    Mild or moderate pain, pressure, or discomfort in your lower abdomen   Fortunately, most UTIs aren't serious, and they're easily treated with antibiotics. Make sure you take all of the antibiotic pills given to you, even if you start to feel better after the first few doses. Otherwise, the UTI might come back.   What to expect   If you follow this treatment plan, you should start to feel better within 1 to 2 days.   When to seek care   Call us at [phone]   with any sudden or unexpected symptoms.    Symptoms that don't improve or get worse in the next 48 hours    Fever that goes above 101F or lasts longer than 24 hours    Shaking or  chills    Nausea or vomiting    Severe flank pain (pain in your back or side)   Other treatment    Rest and drink plenty of water    Urinate frequently and when you first feel the urge    Place a heating pad on your back or stomach to help relieve some of the discomfort   Prevention    Drink a lot of liquids to help flush bacteria from your system. Water is best. Try for six to eight, 8-ounce glasses a day on a regular basis.    Urinate often and when you first feel the urge. Bacteria can grow when urine stays in the bladder too long. Urinate after sex to flush away bacteria.    After using the toilet, always wipe from front to back. This step is most important after a bowel movement. Wiping from front to back prevents bacteria normally found in stool from entering the urinary tract.   Your provider   Your diagnosis was provided by Jacquelyn Eng, a member of your trusted care team at [facility].   If you have any questions, call us at [phone]  .

## 2022-12-15 ENCOUNTER — OFFICE VISIT (OUTPATIENT)
Dept: FAMILY MEDICINE CLINIC | Facility: CLINIC | Age: 57
End: 2022-12-15

## 2022-12-15 ENCOUNTER — LAB (OUTPATIENT)
Dept: LAB | Facility: HOSPITAL | Age: 57
End: 2022-12-15

## 2022-12-15 VITALS
SYSTOLIC BLOOD PRESSURE: 122 MMHG | HEIGHT: 66 IN | BODY MASS INDEX: 23.39 KG/M2 | OXYGEN SATURATION: 97 % | DIASTOLIC BLOOD PRESSURE: 74 MMHG | HEART RATE: 65 BPM | WEIGHT: 145.54 LBS

## 2022-12-15 DIAGNOSIS — Z00.00 WELL ADULT EXAM: ICD-10-CM

## 2022-12-15 DIAGNOSIS — E78.00 PURE HYPERCHOLESTEROLEMIA: ICD-10-CM

## 2022-12-15 DIAGNOSIS — Z83.71 FH: COLON POLYPS: ICD-10-CM

## 2022-12-15 DIAGNOSIS — Z12.11 SCREENING FOR MALIGNANT NEOPLASM OF COLON: ICD-10-CM

## 2022-12-15 DIAGNOSIS — F41.1 GAD (GENERALIZED ANXIETY DISORDER): ICD-10-CM

## 2022-12-15 DIAGNOSIS — B00.1 HERPES LABIALIS: ICD-10-CM

## 2022-12-15 DIAGNOSIS — N95.8 GENITOURINARY SYNDROME OF MENOPAUSE: ICD-10-CM

## 2022-12-15 DIAGNOSIS — Z00.00 WELL ADULT EXAM: Primary | ICD-10-CM

## 2022-12-15 PROBLEM — Q61.3 POLYCYSTIC KIDNEY DISEASE: Status: RESOLVED | Noted: 2018-06-11 | Resolved: 2022-12-15

## 2022-12-15 PROBLEM — I10 ESSENTIAL HYPERTENSION: Status: RESOLVED | Noted: 2020-06-22 | Resolved: 2022-12-15

## 2022-12-15 LAB
ALBUMIN SERPL-MCNC: 4.7 G/DL (ref 3.5–5.2)
ALBUMIN/GLOB SERPL: 2.4 G/DL
ALP SERPL-CCNC: 67 U/L (ref 39–117)
ALT SERPL-CCNC: 10 U/L (ref 1–33)
AST SERPL-CCNC: 15 U/L (ref 1–32)
BILIRUB SERPL-MCNC: 0.4 MG/DL (ref 0–1.2)
BUN SERPL-MCNC: 13 MG/DL (ref 6–20)
BUN/CREAT SERPL: 16.7 (ref 7–25)
CALCIUM SERPL-MCNC: 9.6 MG/DL (ref 8.6–10.5)
CHLORIDE SERPL-SCNC: 103 MMOL/L (ref 98–107)
CHOLEST SERPL-MCNC: 222 MG/DL (ref 0–200)
CO2 SERPL-SCNC: 30.6 MMOL/L (ref 22–29)
CREAT SERPL-MCNC: 0.78 MG/DL (ref 0.57–1)
EGFRCR SERPLBLD CKD-EPI 2021: 87.6 ML/MIN/1.73
ERYTHROCYTE [DISTWIDTH] IN BLOOD BY AUTOMATED COUNT: 12.3 % (ref 12.3–15.4)
GLOBULIN SER CALC-MCNC: 2 GM/DL
GLUCOSE SERPL-MCNC: 98 MG/DL (ref 65–99)
HCT VFR BLD AUTO: 36.7 % (ref 34–46.6)
HDLC SERPL-MCNC: 85 MG/DL (ref 40–60)
HGB BLD-MCNC: 12.3 G/DL (ref 12–15.9)
LDLC SERPL CALC-MCNC: 127 MG/DL (ref 0–100)
MCH RBC QN AUTO: 29.3 PG (ref 26.6–33)
MCHC RBC AUTO-ENTMCNC: 33.5 G/DL (ref 31.5–35.7)
MCV RBC AUTO: 87.4 FL (ref 79–97)
PLATELET # BLD AUTO: 215 10*3/MM3 (ref 140–450)
POTASSIUM SERPL-SCNC: 4.7 MMOL/L (ref 3.5–5.2)
PROT SERPL-MCNC: 6.7 G/DL (ref 6–8.5)
RBC # BLD AUTO: 4.2 10*6/MM3 (ref 3.77–5.28)
SODIUM SERPL-SCNC: 139 MMOL/L (ref 136–145)
TRIGL SERPL-MCNC: 56 MG/DL (ref 0–150)
TSH SERPL DL<=0.005 MIU/L-ACNC: 2.88 UIU/ML (ref 0.27–4.2)
VLDLC SERPL CALC-MCNC: 10 MG/DL (ref 5–40)
WBC # BLD AUTO: 5.91 10*3/MM3 (ref 3.4–10.8)

## 2022-12-15 PROCEDURE — 99396 PREV VISIT EST AGE 40-64: CPT | Performed by: FAMILY MEDICINE

## 2022-12-15 NOTE — PROGRESS NOTES
"Chief Complaint  Well adult exam (fasting) and Annual Exam    Subjective          Alexsandra Valdes presents to [facility] for   History of Present Illness    Still trying to walk every day. She would like to loose weight to 135.    She has worry and situational anxiety. No longer taking paxil.     She uses cream for fever blister.     She had colonoscopy in 2017 and was recommended 5-7 years.     She traveled to [place] in August. When she came back she got UTI, took medication and 3 weeks later UTI. No further UTIs.     She sees OB/GYN every other year.         Objective   Vital Signs:   Vitals:    12/15/22 0757   BP: 122/74   Pulse: 65   SpO2: 97%   Weight: 66 kg (145 lb 8.6 oz)   Height: 167.6 cm (65.98\")     Body mass index is 23.5 kg/m².    BMI is within normal parameters. No other follow-up for BMI required.          Physical Exam  Constitutional:       General: She is not in acute distress.  HENT:      Right Ear: Tympanic membrane and ear canal normal.      Left Ear: Tympanic membrane and ear canal normal.      Mouth/Throat:     Eyes:      General:         Right eye: No discharge.         Left eye: No discharge.      Conjunctiva/sclera: Conjunctivae normal.   Neck:      Thyroid: No thyromegaly.   Cardiovascular:      Rate and Rhythm: Normal rate and regular rhythm.   Pulmonary:      Effort: Pulmonary effort is normal.      Breath sounds: Normal breath sounds.   Abdominal:      Palpations: Abdomen is soft. There is no hepatomegaly.      Tenderness: There is no abdominal tenderness.   Musculoskeletal:      Cervical back: Neck supple.   Lymphadenopathy:      Head:      Right side of head: No submandibular, preauricular or posterior auricular adenopathy.      Left side of head: No submandibular, preauricular or posterior auricular adenopathy.      Cervical: No cervical adenopathy.   Skin:     General: Skin is warm.   Neurological:      Mental Status: She is alert and oriented to " person, place, and time.   Psychiatric:         Mood and Affect: Mood normal.         Behavior: Behavior normal.         Thought Content: Thought content normal.         Judgment: Judgment normal.        Result Review :                Immunization History   Administered Date(s) Administered   • COVID-19 (PFIZER) BIVALENT BOOSTER 12+YRS 10/28/2022   • COVID-19 (PFIZER) PURPLE CAP 02/22/2021, 03/15/2021, 10/29/2021   • Flu Vaccine Intradermal Quad 18-64YR 12/21/2012   • FluLaval/Fluzone >6mos 10/24/2018, 12/04/2019, 10/23/2022   • Flublock Quad =>18yrs 10/03/2020   • Flublok 18+yrs 10/10/2021   • Hepatitis A 12/04/2019, 12/07/2020   • PPD Test 12/21/2012, 10/22/2018, 12/07/2020   • Shingrix 10/03/2020, 12/18/2020   • Tdap 12/03/2018       Health Maintenance   Topic Date Due   • LIPID PANEL  12/14/2022   • MAMMOGRAM  07/25/2023   • PAP SMEAR  10/15/2024   • TDAP/TD VACCINES (2 - Td or Tdap) 12/03/2028   • INFLUENZA VACCINE  Completed   • ZOSTER VACCINE  Completed            Assessment and Plan    Diagnoses and all orders for this visit:    1. Well adult exam (Primary)  -     CBC (No Diff); Future  -     Comprehensive Metabolic Panel; Future  -     Lipid Panel; Future  -     TSH; Future  Check fasting labs today.  Immunizations up-to-date.  Breast cancer screening mammogram up-to-date.  Last colonoscopy 2017 with a repeat recommended in 5 to 7 years.  Patient plans on waiting until 7 years.  Continue well woman care with OB/GYN.  Start weight training.  She has no risk factors for tuberculosis and no symptoms.  No need for tuberculosis screening blood test this year.  2. Pure hypercholesterolemia  -     Lipid Panel; Future  Check labs today.  3. ALTA (generalized anxiety disorder)  Patient is managing her anxiety without medication.  4. Herpes labialis  She is using topical cream prescribed by her dentist.  5. FH: colon polyps  Reviewed colonoscopy report with recommended at 5 to 7-year intervals.  6. Screening for  malignant neoplasm of colon  Patient postponed colonoscopy  7. Genitourinary syndrome of menopause  Discussed regular use of topical estrogen can help vaginal symptoms as well as reduced frequent UTIs.      Counseling/anticipatory guidance:  physical activity, healthy weight, mental health, immunizations, screenings      Follow Up   Return in about 1 year (around 12/15/2023) for Physical and fasting labs.  Patient was given instructions and counseling regarding her condition or for health maintenance advice. Please see specific information pulled into the AVS if appropriate.      Electronically signed by Tosha Morales MD, 12/15/22, 8:24 AM EST.

## 2022-12-15 NOTE — PATIENT INSTRUCTIONS
Exercise    Less pain, better mood, and lower risk of many diseases  Some physical activity is better than none. Try to sit less throughout the day  Fitness is free--No equipment needed to walk, run/jog, dance, hike, Oleg Chi  150 minutes (2 hours and 30 minutes) to 300 minutes (5 hours) a week of moderate-intensity aerobic physical activity, or 75 minutes (1 hour and 15 minutes) to 150 minutes (2 hours and 30 minutes) a week of vigorous-intensity aerobic physical activity has substantial health benefits  Muscle strengthening exercises 2 days per week  Older adults should incorporate balance training   Bones need pressure to get stronger. Weight bearing exercises include running/jogging, dancing, hiking, elliptical, treadmill, stair climbing, jumping rope, aerobics.   Joint friendly low impact activities include walking, biking, swimming, yoga, Oleg Chi, dance. Include range of motion and stretching exercises to improve flexibility.   [email or web address]

## 2023-08-05 PROCEDURE — 87205 SMEAR GRAM STAIN: CPT | Performed by: NURSE PRACTITIONER

## 2023-08-05 PROCEDURE — 87070 CULTURE OTHR SPECIMN AEROBIC: CPT | Performed by: NURSE PRACTITIONER
